# Patient Record
Sex: FEMALE | Race: BLACK OR AFRICAN AMERICAN | NOT HISPANIC OR LATINO | Employment: FULL TIME | URBAN - METROPOLITAN AREA
[De-identification: names, ages, dates, MRNs, and addresses within clinical notes are randomized per-mention and may not be internally consistent; named-entity substitution may affect disease eponyms.]

---

## 2017-10-21 ENCOUNTER — APPOINTMENT (OUTPATIENT)
Dept: GENERAL RADIOLOGY | Facility: CLINIC | Age: 37
End: 2017-10-21
Attending: EMERGENCY MEDICINE
Payer: COMMERCIAL

## 2017-10-21 ENCOUNTER — HOSPITAL ENCOUNTER (EMERGENCY)
Facility: CLINIC | Age: 37
Discharge: HOME | End: 2017-10-21
Attending: EMERGENCY MEDICINE | Admitting: EMERGENCY MEDICINE
Payer: COMMERCIAL

## 2017-10-21 VITALS
SYSTOLIC BLOOD PRESSURE: 159 MMHG | DIASTOLIC BLOOD PRESSURE: 104 MMHG | TEMPERATURE: 98.5 F | RESPIRATION RATE: 17 BRPM | HEART RATE: 83 BPM | OXYGEN SATURATION: 98 %

## 2017-10-21 DIAGNOSIS — T73.3XXA OVEREXERTION FROM PROLONGED STATIC POSITION: ICD-10-CM
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
## 2017-10-21 DIAGNOSIS — S93.492A SPRAIN OF ANTERIOR TALOFIBULAR LIGAMENT OF LEFT ANKLE, INITIAL ENCOUNTER: ICD-10-CM

## 2017-10-21 DIAGNOSIS — R07.89 OTHER CHEST PAIN: ICD-10-CM

## 2017-10-21 PROCEDURE — 99284 EMERGENCY DEPT VISIT MOD MDM: CPT | Mod: 25 | Performed by: EMERGENCY MEDICINE

## 2017-10-21 PROCEDURE — 25000132 ZZH RX MED GY IP 250 OP 250 PS 637: Performed by: EMERGENCY MEDICINE

## 2017-10-21 PROCEDURE — 73610 X-RAY EXAM OF ANKLE: CPT | Mod: LT

## 2017-10-21 PROCEDURE — 99284 EMERGENCY DEPT VISIT MOD MDM: CPT

## 2017-10-21 PROCEDURE — 93010 ELECTROCARDIOGRAM REPORT: CPT | Mod: Z6 | Performed by: EMERGENCY MEDICINE

## 2017-10-21 PROCEDURE — 25000125 ZZHC RX 250: Performed by: EMERGENCY MEDICINE

## 2017-10-21 PROCEDURE — 93005 ELECTROCARDIOGRAM TRACING: CPT

## 2017-10-21 PROCEDURE — 73590 X-RAY EXAM OF LOWER LEG: CPT | Mod: LT

## 2017-10-21 RX ORDER — NAPROXEN 500 MG/1
500 TABLET ORAL 2 TIMES DAILY PRN
Qty: 30 TABLET | Refills: 0 | Status: SHIPPED | OUTPATIENT
Start: 2017-10-21 | End: 2018-10-18

## 2017-10-21 RX ORDER — ACETAMINOPHEN 325 MG/1
650 TABLET ORAL ONCE
Status: COMPLETED | OUTPATIENT
Start: 2017-10-21 | End: 2017-10-21

## 2017-10-21 RX ORDER — IBUPROFEN 800 MG/1
800 TABLET, FILM COATED ORAL ONCE
Status: COMPLETED | OUTPATIENT
Start: 2017-10-21 | End: 2017-10-21

## 2017-10-21 RX ADMIN — ACETAMINOPHEN 650 MG: 325 TABLET, FILM COATED ORAL at 22:38

## 2017-10-21 RX ADMIN — IBUPROFEN 800 MG: 800 TABLET ORAL at 22:38

## 2017-10-21 RX ADMIN — LIDOCAINE HYDROCHLORIDE 30 ML: 20 SOLUTION ORAL; TOPICAL at 22:38

## 2017-10-21 NOTE — ED AVS SNAPSHOT
[facility], Emergency Department    [address] [facility] [address]    Phone:  [phone]                                       Elina Hood   MRN: [id number]    Department:  [facility], Emergency Department   Date of Visit:  10/21/2017           Patient Information     Date Of Birth          1980        Your diagnoses for this visit were:     Sprain of anterior talofibular ligament of left ankle, initial encounter        You were seen by Julian Sosa MD.        Discharge Instructions       Rest the affected painful area as much as possible.  Apply ice for 15-20 minutes intermittently as needed and especially after any offending activity. Daily stretching.  As pain recedes, begin normal activities slowly as tolerated.  Consider Physical Therapy if symptoms not better with symptomatic care.    Please make an appointment to follow up with Sports Medicine (phone: [phone]) in 10-14 days if pain continues.     Return to the ED if you are worsening symptoms, or any other urgent/life-threatening concerns.       24 Hour Appointment Hotline       To make an appointment at any [facility] clinic, call [phone] ([phone]). If you don't have a family doctor or clinic, we will help you find one. [facility] clinics are conveniently located to serve the needs of you and your family.             Review of your medicines      START taking        Dose / Directions Last dose taken    naproxen 500 MG tablet   Commonly known as:  NAPROSYN   Dose:  500 mg   Quantity:  30 tablet        Take 1 tablet (500 mg) by mouth 2 times daily as needed for moderate pain   Refills:  0          Our records show that you are taking the medicines listed below. If these are incorrect, please call your family doctor or clinic.        Dose / Directions Last dose taken    albuterol 1.25 MG/3ML nebulizer solution   Commonly known as:  ACCUNEB   Dose:  1 vial        Take 1 vial by nebulization every 6 hours as  needed for shortness of breath / dyspnea or wheezing   Refills:  0        COLACE PO   Dose:  50 mg        Take 50 mg by mouth   Refills:  0        HYDROcodone-acetaminophen 5-325 MG per tablet   Commonly known as:  NORCO   Dose:  1-2 tablet   Quantity:  15 tablet        Take 1-2 tablets by mouth every 6 hours as needed for moderate to severe pain   Refills:  0        IBUPROFEN PO   Dose:  400 mg        Take 400 mg by mouth   Refills:  0        loratadine 10 MG tablet   Commonly known as:  CLARITIN   Dose:  10 mg        Take 10 mg by mouth daily   Refills:  0        OMEPRAZOLE PO        Refills:  0        OXYCODONE HCL PO   Dose:  10 mg        Take 10 mg by mouth   Refills:  0        senna-docusate 8.6-50 MG per tablet   Commonly known as:  SENOKOT-S;PERICOLACE   Dose:  2 tablet   Quantity:  120 tablet        Take 2 tablets by mouth 2 times daily   Refills:  1                Prescriptions were sent or printed at these locations (1 Prescription)                   Other Prescriptions                Printed at Department/Unit printer (1 of 1)         naproxen (NAPROSYN) 500 MG tablet                Procedures and tests performed during your visit     Ankle XR, G/E 3 views, left    EKG 12 lead    Tib/Fib XR, left      Orders Needing Specimen Collection     None      Pending Results     Date and Time Order Name Status Description    10/21/2017 2204 EKG 12 lead Preliminary     10/21/2017 2204 Ankle XR, G/E 3 views, left Preliminary     10/21/2017 2204 Tib/Fib XR, left Preliminary             Pending Culture Results     No orders found from 10/19/2017 to 10/22/2017.            Pending Results Instructions     If you had any lab results that were not finalized at the time of your Discharge, you can call the ED Lab Result RN at [phone]. You will be contacted by this team for any positive Lab results or changes in treatment. The nurses are available 7 days a week from 10A to 6:30P.  You can leave a message 24 hours per  "day and they will return your call.        Thank you for choosing [facility]       Thank you for choosing [facility] for your care. Our goal is always to provide you with excellent care. Hearing back from our patients is one way we can continue to improve our services. Please take a few minutes to complete the written survey that you may receive in the mail after you visit with us. Thank you!        [employer] - [employer] Information     [employer]eez lets you send messages to your doctor, view your test results, renew your prescriptions, schedule appointments and more. To sign up, go to [email or web address] . Click on \"Log in\" on the left side of the screen, which will take you to the Welcome page. Then click on \"Sign up Now\" on the right side of the page.     You will be asked to enter the access code listed below, as well as some personal information. Please follow the directions to create your username and password.     Your access code is: [email or web address]  Expires: 2018 11:19 PM     Your access code will  in 90 days. If you need help or a new code, please call your [facility] clinic or [phone].        Care EveryWhere ID     This is your Care EveryWhere ID. This could be used by other organizations to access your [facility] medical records  [id number]        Equal Access to Services     EUFEMIA BLOUNT : Pina Hernandez, waaxda luqadaha, qaybta kaalmada adekatharineyada, dennys zapata. So [facility] [phone].    ATENCIÓN: Si habla español, tiene a hendricks disposición servicios gratuitos de asistencia lingüística. Llame al [phone].    We comply with applicable federal civil rights laws and [place] laws. We do not discriminate on the basis of race, color, national origin, age, disability, sex, sexual orientation, or gender identity.            After Visit Summary       This is your record. Keep this with you and show to your community pharmacist(s) and doctor(s) at your next visit.             "

## 2017-10-21 NOTE — ED AVS SNAPSHOT
[facility], [place], Emergency Department    [address] [place] [address]    Phone:  [phone]                                       Elina Hood   MRN: [id number]    Department:  [facility], Emergency Department   Date of Visit:  10/21/2017           After Visit Summary Signature Page     I have received my discharge instructions, and my questions have been answered. I have discussed any challenges I see with this plan with the nurse or doctor.    ..........................................................................................................................................  Patient/Patient Representative Signature      ..........................................................................................................................................  Patient Representative Print Name and Relationship to Patient    ..................................................               ................................................  Date                                            Time    ..........................................................................................................................................  Reviewed by Signature/Title    ...................................................              ..............................................  Date                                                            Time

## 2017-10-22 LAB — INTERPRETATION ECG - MUSE: NORMAL

## 2017-10-22 NOTE — DISCHARGE INSTRUCTIONS
Rest the affected painful area as much as possible.  Apply ice for 15-20 minutes intermittently as needed and especially after any offending activity. Daily stretching.  As pain recedes, begin normal activities slowly as tolerated.  Consider Physical Therapy if symptoms not better with symptomatic care.    Please make an appointment to follow up with Sports Medicine (phone: [phone]) in 10-14 days if pain continues.     Return to the ED if you are worsening symptoms, or any other urgent/life-threatening concerns.

## 2017-10-22 NOTE — ED NOTES
Patient hurt her left ankle running from a ghost at a haunted house yesterday. Since then it has become more swollen and painful. She reports taking aspirin x 2 for pain with little relief.

## 2018-01-03 ENCOUNTER — OFFICE VISIT (OUTPATIENT)
Dept: ORTHOPEDICS | Facility: CLINIC | Age: 38
End: 2018-01-03
Payer: COMMERCIAL

## 2018-01-03 VITALS — RESPIRATION RATE: 16 BRPM | DIASTOLIC BLOOD PRESSURE: 120 MMHG | HEART RATE: 70 BPM | SYSTOLIC BLOOD PRESSURE: 170 MMHG

## 2018-01-03 DIAGNOSIS — M25.572 CHRONIC PAIN OF LEFT ANKLE: Primary | ICD-10-CM

## 2018-01-03 DIAGNOSIS — G89.29 CHRONIC PAIN OF LEFT ANKLE: Primary | ICD-10-CM

## 2018-01-03 NOTE — LETTER
1/3/2018      RE: Elina Hood  [address]  [facility] [address]        Subjective:   Elina Hood is a 37 year old female who is c/o left lateral ankle pain. In the woods, tripped on tree stump and ankle turned.  Also pain dorsum of foot. ED 10/21/17. Currently using stirrup ankle brace. Reports doing exercises she found online. She fell in an eversion mechanism over tree stump.   Icing, ROM, heels for [place] and couldn't walk.    Hurts lateral and medial malleolus, forefoot.  Never had an ankle sprain before.    Background:   Date of injury: 10/20/17   Duration of symptoms: 2-3 months  Mechanism of Injury: Acute; Unknown Fell  Intensity: 4/10 at rest, 6/10 with activity   Aggravating factors: Walking, stairs, plantarflexion and inversion, pressure  Relieving Factors: Tylenol, ibuprofen  Prior Evaluation: Prior Physician Evalutation: ED, xrays    PAST MEDICAL, SOCIAL, SURGICAL AND FAMILY HISTORY: She  has a past medical history of Allergic state; GERD (gastroesophageal reflux disease); and Hypertension.  She  has no past surgical history on file.  Her family history is not on file.  She reports that she has never smoked. She has never used smokeless tobacco. She reports that she drinks alcohol. She reports that she does not use illicit drugs.      ALLERGIES: She is allergic to dilaudid [hydromorphone].    CURRENT MEDICATIONS: She has a current medication list which includes the following prescription(s): naproxen, albuterol, ibuprofen, senna-docusate, loratadine, docusate sodium, omeprazole, hydrocodone-acetaminophen, and oxycodone hcl.     REVIEW OF SYSTEMS: 10 point review of systems is negative except as noted above.     Exam:   BP (!) 170/120 (BP Location: Right arm, Patient Position: Sitting, Cuff Size: Adult Large)  Pulse 70  Resp 16  LMP  (LMP Unknown)           CONSTITUTIONAL: healthy, alert, no distress and cooperative  HEAD: Normocephalic. No masses, lesions, tenderness or  abnormalities  SKIN: no suspicious lesions or rashes  GAIT: normal  NEUROLOGIC: Non-focal, Normal muscle tone and strength, reflexes normal, sensation grossly normal.  PSYCHIATRIC: affect normal/bright and mentation appears normal.    MUSCULOSKELETAL: left ankle pain    ANKLE  Inspection/Palpation:     Swelling: mild lateral left ankle swelling   Tender: ATFL, CFL, PTFL, peroneal tendons, medial malleolus     Non-tender: deltoid ligament, anterior tib-fib ligament, achilles  tendon, no achilles tendon defect   Range of Motion: dorsiflexion: full, plantarflexion: full, inversion: full, eversion: full  Strength:dorsiflexion: 5/5, plantarflexion: 5/5, inversion: 5/5, eversion: 5/5   Special tests: negative anterior drawer, negative varus stress, negative valgus stress, negative forced external rotation, negative Venegas sign     FOOT  Inspection/Palpation:      Swelling: no swelling     Non-tender: promixal 5th metatarsal, 1st, 2nd, 3rd, 4th, 5th metatarsals, calcaneous, cuboid,  navicular, cuneiform lateral, cuneiform middle, cuneiform medial, metatarsal heads, peroneal tendon: at lateral malleolus, at cuboid, at proximal 5th metatarsal, posterior tibial tendon at medial malleolus, posterior tibial tendon at navicular, plantar fascia  Range of Motion: flexion of toes: full, extension of toes: full         Assessment/Plan:   Pt is a 38 yo obese AA female with PMHx of HTN, GERD presenting with left ankle pain  1. Left ankle pain-Grade 2 ankle sprain back in October, has been in Air cast since, needs to advance ROM and use more supportive shoe, to PT for home strengthening program and work on balance and proprioception    RTC 6 weeks      X-RAY INTERPRETATION:   Reviewed past imaging    Kerrie Pretty MD

## 2018-01-03 NOTE — PROGRESS NOTES
Subjective:   Elina Hood is a 37 year old female who is c/o left lateral ankle pain. In the woods, tripped on tree stump and ankle turned.  Also pain dorsum of foot. ED 10/21/17. Currently using stirrup ankle brace. Reports doing exercises she found online. She fell in an eversion mechanism over tree stump.   Icing, ROM, heels for [place] and couldn't walk.    Hurts lateral and medial malleolus, forefoot.  Never had an ankle sprain before.    Background:   Date of injury: 10/20/17   Duration of symptoms: 2-3 months  Mechanism of Injury: Acute; Unknown Fell  Intensity: 4/10 at rest, 6/10 with activity   Aggravating factors: Walking, stairs, plantarflexion and inversion, pressure  Relieving Factors: Tylenol, ibuprofen  Prior Evaluation: Prior Physician Evalutation: ED, xrays    PAST MEDICAL, SOCIAL, SURGICAL AND FAMILY HISTORY: She  has a past medical history of Allergic state; GERD (gastroesophageal reflux disease); and Hypertension.  She  has no past surgical history on file.  Her family history is not on file.  She reports that she has never smoked. She has never used smokeless tobacco. She reports that she drinks alcohol. She reports that she does not use illicit drugs.      ALLERGIES: She is allergic to dilaudid [hydromorphone].    CURRENT MEDICATIONS: She has a current medication list which includes the following prescription(s): naproxen, albuterol, ibuprofen, senna-docusate, loratadine, docusate sodium, omeprazole, hydrocodone-acetaminophen, and oxycodone hcl.     REVIEW OF SYSTEMS: 10 point review of systems is negative except as noted above.     Exam:   BP (!) 170/120 (BP Location: Right arm, Patient Position: Sitting, Cuff Size: Adult Large)  Pulse 70  Resp 16  LMP  (LMP Unknown)           CONSTITUTIONAL: healthy, alert, no distress and cooperative  HEAD: Normocephalic. No masses, lesions, tenderness or abnormalities  SKIN: no suspicious lesions or rashes  GAIT: normal  NEUROLOGIC: Non-focal,  Normal muscle tone and strength, reflexes normal, sensation grossly normal.  PSYCHIATRIC: affect normal/bright and mentation appears normal.    MUSCULOSKELETAL: left ankle pain    ANKLE  Inspection/Palpation:     Swelling: mild lateral left ankle swelling   Tender: ATFL, CFL, PTFL, peroneal tendons, medial malleolus     Non-tender: deltoid ligament, anterior tib-fib ligament, achilles  tendon, no achilles tendon defect   Range of Motion: dorsiflexion: full, plantarflexion: full, inversion: full, eversion: full  Strength:dorsiflexion: 5/5, plantarflexion: 5/5, inversion: 5/5, eversion: 5/5   Special tests: negative anterior drawer, negative varus stress, negative valgus stress, negative forced external rotation, negative Venegas sign     FOOT  Inspection/Palpation:      Swelling: no swelling     Non-tender: promixal 5th metatarsal, 1st, 2nd, 3rd, 4th, 5th metatarsals, calcaneous, cuboid,  navicular, cuneiform lateral, cuneiform middle, cuneiform medial, metatarsal heads, peroneal tendon: at lateral malleolus, at cuboid, at proximal 5th metatarsal, posterior tibial tendon at medial malleolus, posterior tibial tendon at navicular, plantar fascia  Range of Motion: flexion of toes: full, extension of toes: full         Assessment/Plan:   Pt is a 36 yo obese AA female with PMHx of HTN, GERD presenting with left ankle pain  1. Left ankle pain-Grade 2 ankle sprain back in October, has been in Air cast since, needs to advance ROM and use more supportive shoe, to PT for home strengthening program and work on balance and proprioception    RTC 6 weeks      X-RAY INTERPRETATION:   Reviewed past imaging

## 2018-01-03 NOTE — MR AVS SNAPSHOT
After Visit Summary   1/3/2018    Elina Hood    MRN: [id number]           Patient Information     Date Of Birth          1980        Visit Information        Provider Department      1/3/2018 12:00 PM Kerrie Pretty MD [facility]        Today's Diagnoses     Chronic pain of left ankle    -  1       Follow-ups after your visit        Additional Services     PHYSICAL THERAPY REFERRAL (Internal)       Physical Therapy Referral                  Follow-up notes from your care team     Return in about 6 weeks (around 2/14/2018), or if symptoms worsen or fail to improve.      Your next 10 appointments already scheduled     Feb 14, 2018 10:00 AM CST   (Arrive by 9:45 AM)   Return Visit with Kerrie Pretty MD   [facility] ([facility])    [address] [place] [address] [phone]              Who to contact     Please call your clinic at [phone] to:    Ask questions about your health    Make or cancel appointments    Discuss your medicines    Learn about your test results    Speak to your doctor   If you have compliments or concerns about an experience at your clinic, or if you wish to file a complaint, please contact [facility] Patient Relations at [phone] or email us at [email or web address]         Additional Information About Your Visit        MyChart Information     [employer] is an electronic gateway that provides easy, online access to your medical records. With [employer], you can request a clinic appointment, read your test results, renew a prescription or communicate with your care team.     To sign up for [employer] visit the website at [email or web address]   You will be asked to enter the access code listed below, as well as some personal information. Please follow the directions to create your username and password.     Your access  code is: [email or web address]  Expires: 2018 10:19 PM     Your access code will  in 90 days. If you need help or a new code, please contact your [facility] or call [phone] for assistance.        Care EveryWhere ID     This is your Care EveryWhere ID. This could be used by other organizations to access your [facility] medical records  [id number]        Your Vitals Were     Pulse Respirations Last Period             70 16 (LMP Unknown)          Blood Pressure from Last 3 Encounters:   18 (!) 170/120   10/21/17 (!) 159/104   16 (!) 169/99    Weight from Last 3 Encounters:   16 210 lb (95.3 kg)   16 210 lb (95.3 kg)   16 250 lb (113.4 kg)              We Performed the Following     PHYSICAL THERAPY REFERRAL (Internal)        Primary Care Provider Office Phone # Fax #    Marleen Santiago [phone]       [facility] [address] [facility] [address]        Equal Access to Services     [facility]: Hadii aad ku hadasho Soeliecer, waaxda luqadaha, qaybta kaalmada zander, dennys linda . So [facility] [phone].    ATENCIÓN: Si habla español, tiene a hendricks disposición servicios gratuitos de asistencia lingüística. Ra al [phone].    We comply with applicable federal civil rights laws and [place] laws. We do not discriminate on the basis of race, color, national origin, age, disability, sex, sexual orientation, or gender identity.            Thank you!     Thank you for choosing [facility]  for your care. Our goal is always to provide you with excellent care. Hearing back from our patients is one way we can continue to improve our services. Please take a few minutes to complete the written survey that you may receive in the mail after your visit with us. Thank you!             Your Updated Medication List - Protect others around you: Learn how to safely use, store and  throw away your medicines at www.disposemymeds.org.          This list is accurate as of: 1/3/18 12:40 PM.  Always use your most recent med list.                   Brand Name Dispense Instructions for use Diagnosis    albuterol 1.25 MG/3ML nebulizer solution    ACCUNEB     Take 1 vial by nebulization every 6 hours as needed for shortness of breath / dyspnea or wheezing        COLACE PO      Take 50 mg by mouth        HYDROcodone-acetaminophen 5-325 MG per tablet    NORCO    15 tablet    Take 1-2 tablets by mouth every 6 hours as needed for moderate to severe pain        IBUPROFEN PO      Take 400 mg by mouth        loratadine 10 MG tablet    CLARITIN     Take 10 mg by mouth daily        naproxen 500 MG tablet    NAPROSYN    30 tablet    Take 1 tablet (500 mg) by mouth 2 times daily as needed for moderate pain        OMEPRAZOLE PO           OXYCODONE HCL PO      Take 10 mg by mouth        senna-docusate 8.6-50 MG per tablet    SENOKOT-S;PERICOLACE    120 tablet    Take 2 tablets by mouth 2 times daily

## 2018-01-17 ENCOUNTER — THERAPY VISIT (OUTPATIENT)
Dept: PHYSICAL THERAPY | Facility: CLINIC | Age: 38
End: 2018-01-17
Payer: COMMERCIAL

## 2018-01-17 DIAGNOSIS — S93.409A ANKLE SPRAIN: Primary | ICD-10-CM

## 2018-01-17 PROCEDURE — 97110 THERAPEUTIC EXERCISES: CPT | Mod: GP | Performed by: PHYSICAL THERAPIST

## 2018-01-17 PROCEDURE — 97161 PT EVAL LOW COMPLEX 20 MIN: CPT | Mod: GP | Performed by: PHYSICAL THERAPIST

## 2018-01-17 NOTE — PROGRESS NOTES
[facility] Initial Evaluation  Subjective:  Patient is a 37 year old female presenting with rehab right ankle/foot hpi.   Elina Hood is a 37 year old female with a left ankle condition.  Condition occurred with:  A fall/slip and a twist.  Condition occurred: in the community.  This is a new condition  October 20, 2017 - Pt sprained left ankle while hiking in the woods and twisting ankle on a tree branch. Initially had a lot of swelling. Swelling and pain has improved bug pain and instability has limited her. She has not been able to get back to wearing heels. Was wearing a brace for awhile but has been out of it lately. Notices with stairs and walking that ankle can feel weak and want to give out. She has not been able to walk long periods as well. .    Patient reports pain:  Lateral (medial ankle and top of foot occasionally ).  Radiates to:  No radiation.  Pain is described as aching and sharp and is constant Pain Scale: 6/10 at worst on stairs, otherwise usually 3/10.  Associated symptoms:  Edema, loss of strength and loss of motion/stiffness. Pain is the same all the time.  Symptoms are exacerbated by ascending stairs, descending stairs, walking and sitting and relieved by rest and other (was wearing brace and was icing).  Since onset symptoms are gradually improving.  Special tests:  X-ray (negative).  Previous treatment: none.  Improvement with previous treatment: NA.  General health as reported by patient is good.                                              Objective:  System    Ankle/Foot Evaluation  ROM:    AROM:    Dorsiflexion:  Left:   5 knee extended, 12 knee flexed  Right:   5 knee extended, 12 knee flexed  Plantarflexion:  Left:  36    Right:  47  Inversion:  Left:  14     Right:  22  Eversion:  14     Right:  14        Strength:    Dorsiflexion:  Left: 4/5     Pain:   Right: 5/5   Pain:  Plantarflexion: Left: 4+/5   Pain:   Right: 5/5  Pain:  Inversion:Left: 4+/5  Pain:      Right: 5/5  Pain:  Eversion:Left: 4+/5  Pain:  Right: 5/5  Pain:                  LIGAMENT TESTING:   Anterior Drawer (ATF) Left: Gr II and pos     Posterior Drawer (PTF) Left: Gr I     Varus Stress (Calc Fib) Left: trace and pos              PALPATION:   Left ankle tenderness present at:  posterior talofibular ligament; calcaneofibular ligament and lateral malleolus  Left ankle tenderness not present at:   deltoid ligament; anterior talofibular ligament or medial malleolus    EDEMA:   Left ankle edema present at: lateral          MOBILITY TESTING:       Talocrural Left: normal            FUNCTIONAL TESTS:           Proprioception:  Stork Balance Test: Left: 7 sec (And pain)  Right: 30 sec                                                     General     ROS    Assessment/Plan:    Patient is a 37 year old female with left side ankle complaints.    Patient has the following significant findings with corresponding treatment plan.                Diagnosis 1:  Left Lateral Ankle Sprain    Pain -  hot/cold therapy, manual therapy, self management, education and home program  Decreased ROM/flexibility - manual therapy, therapeutic exercise and home program  Decreased strength - therapeutic exercise, therapeutic activities and home program  Impaired balance - neuro re-education, therapeutic activities and home program  Inflammation - cold therapy and self management/home program  Decreased function - therapeutic activities and home program  Instability -  Therapeutic Activity  Therapeutic Exercise  Neuromuscular Re-education  home program    Therapy Evaluation Codes:   1) History comprised of:   Personal factors that impact the plan of care:      None.    Comorbidity factors that impact the plan of care are:      Overweight.     Medications impacting care: Pain.  2) Examination of Body Systems comprised of:   Body structures and functions that impact the plan of care:      Ankle.   Activity limitations that impact the plan  of care are:      Sitting, Stairs and Walking.  3) Clinical presentation characteristics are:   Stable/Uncomplicated.  4) Decision-Making    Low complexity using standardized patient assessment instrument and/or measureable assessment of functional outcome.  Cumulative Therapy Evaluation is: Low complexity.    Previous and current functional limitations:  (See Goal Flow Sheet for this information)    Short term and Long term goals: (See Goal Flow Sheet for this information)     Communication ability:  Patient appears to be able to clearly communicate and understand verbal and written communication and follow directions correctly.  Treatment Explanation - The following has been discussed with the patient:   RX ordered/plan of care  Anticipated outcomes  Possible risks and side effects  This patient would benefit from PT intervention to resume normal activities.   Rehab potential is good.    Frequency:  1 X week, once daily  Duration:  for 4 weeks tapering to 2 X a month over 8 weeks  Discharge Plan:  Achieve all LTG.  Independent in home treatment program.  Reach maximal therapeutic benefit.    Please refer to the daily flowsheet for treatment today, total treatment time and time spent performing 1:1 timed codes.

## 2018-01-17 NOTE — MR AVS SNAPSHOT
"              After Visit Summary   1/17/2018    Elina oHod    MRN: [id number]           Patient Information     Date Of Birth          1980        Visit Information        Provider Department      1/17/2018 11:30 AM Marisa Boogie PT [facility] [place]        Today's Diagnoses     Ankle sprain    -  1       Follow-ups after your visit        Your next 10 appointments already scheduled     Feb 14, 2018 10:00 AM CST   (Arrive by 9:45 AM)   Return Visit with Kerrie Pretty MD   [facility] ([facility])    [address] [place] [id number] [phone]              Who to contact     If you have questions or need follow up information about today's clinic visit or your schedule please contact [place] [facility] [employer] [place] directly at [phone].  Normal or non-critical lab and imaging results will be communicated to you by MyChart, letter or phone within 4 business days after the clinic has received the results. If you do not hear from us within 7 days, please contact the clinic through MyChart or phone. If you have a critical or abnormal lab result, we will notify you by phone as soon as possible.  Submit refill requests through Sunrise or call your pharmacy and they will forward the refill request to us. Please allow 3 business days for your refill to be completed.          Additional Information About Your Visit        MyChart Information     Sunrise lets you send messages to your doctor, view your test results, renew your prescriptions, schedule appointments and more. To sign up, go to [email or web address] . Click on \"Log in\" on the left side of the screen, which will take you to the Welcome page. Then click on \"Sign up Now\" on the right side of the page.     You will be asked to enter the access code listed below, as well as some personal information. Please follow the directions to " create your username and password.     Your access code is: [email or web address]  Expires: 2018 10:19 PM     Your access code will  in 90 days. If you need help or a new code, please call your [place] clinic or [phone].        Care EveryWhere ID     This is your Care EveryWhere ID. This could be used by other organizations to access your [place] medical records  [id number]        Your Vitals Were     Last Period                   (LMP Unknown)            Blood Pressure from Last 3 Encounters:   18 (!) 170/120   10/21/17 (!) 159/104   16 (!) 169/99    Weight from Last 3 Encounters:   16 95.3 kg (210 lb)   16 95.3 kg (210 lb)   16 113.4 kg (250 lb)              We Performed the Following     HC PT EVAL, LOW COMPLEXITY     GELACIO INITIAL EVAL REPORT     THERAPEUTIC EXERCISES        Primary Care Provider Office Phone # Fax #    Marleen Santiago [phone]       [place] [facility] [address] [facility] [address]        Equal Access to Services     [facility]: Hadii aad ku hadasho Soomaali, waaxda luqadaha, qaybta kaalmada adeegyada, waxay idiin hayaan rebecca linda . So [facility] [phone].    ATENCIÓN: Si habla español, tiene a hendricks disposición servicios gratuitos de asistencia lingüística. Llame al [phone].    We comply with applicable federal civil rights laws and [place] laws. We do not discriminate on the basis of race, color, national origin, age, disability, sex, sexual orientation, or gender identity.            Thank you!     Thank you for choosing [facility] [place]  for your care. Our goal is always to provide you with excellent care. Hearing back from our patients is one way we can continue to improve our services. Please take a few minutes to complete the written survey that you may receive in the mail after your visit with us. Thank you!             Your Updated Medication List - Protect others  around you: Learn how to safely use, store and throw away your medicines at www.disposemymeds.org.          This list is accurate as of: 1/17/18  2:04 PM.  Always use your most recent med list.                   Brand Name Dispense Instructions for use Diagnosis    albuterol 1.25 MG/3ML nebulizer solution    ACCUNEB     Take 1 vial by nebulization every 6 hours as needed for shortness of breath / dyspnea or wheezing        COLACE PO      Take 50 mg by mouth        HYDROcodone-acetaminophen 5-325 MG per tablet    NORCO    15 tablet    Take 1-2 tablets by mouth every 6 hours as needed for moderate to severe pain        IBUPROFEN PO      Take 400 mg by mouth        loratadine 10 MG tablet    CLARITIN     Take 10 mg by mouth daily        naproxen 500 MG tablet    NAPROSYN    30 tablet    Take 1 tablet (500 mg) by mouth 2 times daily as needed for moderate pain        OMEPRAZOLE PO           OXYCODONE HCL PO      Take 10 mg by mouth        senna-docusate 8.6-50 MG per tablet    SENOKOT-S;PERICOLACE    120 tablet    Take 2 tablets by mouth 2 times daily

## 2018-01-18 NOTE — PROGRESS NOTES
[facility] Initial Evaluation  Subjective:  Patient is a 37 year old female presenting with rehab left ankle/foot hpi.                    and reported as 3/10.                  Pertinent medical history includes:  High blood pressure, depression, overweight and migraines.      Current medications:  Muscle relaxants, high blood pressure medication and pain medication.      Employment tasks: driving.                                Objective:  System    Physical Exam    General     ROS    Assessment/Plan:

## 2018-02-20 ENCOUNTER — THERAPY VISIT (OUTPATIENT)
Dept: PHYSICAL THERAPY | Facility: CLINIC | Age: 38
End: 2018-02-20
Payer: COMMERCIAL

## 2018-02-20 DIAGNOSIS — S93.402D SPRAIN OF LEFT ANKLE, UNSPECIFIED LIGAMENT, SUBSEQUENT ENCOUNTER: ICD-10-CM

## 2018-02-20 PROCEDURE — 97110 THERAPEUTIC EXERCISES: CPT | Mod: GP | Performed by: PHYSICAL THERAPIST

## 2018-02-20 PROCEDURE — 97112 NEUROMUSCULAR REEDUCATION: CPT | Mod: GP | Performed by: PHYSICAL THERAPIST

## 2018-02-20 NOTE — MR AVS SNAPSHOT
"              After Visit Summary   2/20/2018    Elina Hood    MRN: [id number]           Patient Information     Date Of Birth          1980        Visit Information        Provider Department      2/20/2018 7:40 AM Marisa Boogie PT [facility]        Today's Diagnoses     Sprain of left ankle, unspecified ligament, subsequent encounter           Follow-ups after your visit        Your next 10 appointments already scheduled     Feb 28, 2018 10:50 AM CST   GELACIO Extremity with Marisa Boogie PT   [facility] [facility] ([facility])    [address] [place] [id number] [phone]            Mar 06, 2018 11:00 AM CST   GELACIO Extremity with Marisa Boogie PT   [facility] [employer] [facility] ([facility])    [address] [place] [id number] [phone]              Who to contact     If you have questions or need follow up information about today's clinic visit or your schedule please contact [facility] directly at [phone].  Normal or non-critical lab and imaging results will be communicated to you by [facility], letter or phone within 4 business days after the clinic has received the results. If you do not hear from us within 7 days, please contact the clinic through [employer] or phone. If you have a critical or abnormal lab result, we will notify you by phone as soon as possible.  Submit refill requests through myWebRoom or call your pharmacy and they will forward the refill request to us. Please allow 3 business days for your refill to be completed.          Additional Information About Your Visit        [facility] Information     myWebRoom lets you send messages to your doctor, view your test results, renew your prescriptions, schedule appointments and more. To sign up, go to [email or web address] . Click on \"Log in\" on the left side of the screen, " "which will take you to the Welcome page. Then click on \"Sign up Now\" on the right side of the page.     You will be asked to enter the access code listed below, as well as some personal information. Please follow the directions to create your username and password.     Your access code is: [email or web address]  Expires: 2018  6:31 AM     Your access code will  in 90 days. If you need help or a new code, please call your [place] clinic or [phone].        Care EveryWhere ID     This is your Care EveryWhere ID. This could be used by other organizations to access your [place] medical records  [id number]        Your Vitals Were     Last Period                   (LMP Unknown)            Blood Pressure from Last 3 Encounters:   18 (!) 170/120   10/21/17 (!) 159/104   16 (!) 169/99    Weight from Last 3 Encounters:   16 95.3 kg (210 lb)   16 95.3 kg (210 lb)   16 113.4 kg (250 lb)              We Performed the Following     GELACIO PROGRESS NOTES REPORT     NEUROMUSCULAR RE-EDUCATION     THERAPEUTIC EXERCISES        Primary Care Provider Office Phone # Fax #    Marleen Santiago [phone]       [facility] [address] [facility] [address]        Equal Access to Services     [facility] : Hadii aad ku hadasho Soarunali, waaxda luqadaha, qaybta kaalmada adekatharineyada, dennys linda . So [facility] [phone].    ATENCIÓN: Si habla español, tiene a hendricks disposición servicios gratuitos de asistencia lingüística. Ra al [phone].    We comply with applicable federal civil rights laws and [place] laws. We do not discriminate on the basis of race, color, national origin, age, disability, sex, sexual orientation, or gender identity.            Thank you!     Thank you for choosing [facility] [place]  for your care. Our goal is always to provide you with excellent care. Hearing back from our patients is one " way we can continue to improve our services. Please take a few minutes to complete the written survey that you may receive in the mail after your visit with us. Thank you!             Your Updated Medication List - Protect others around you: Learn how to safely use, store and throw away your medicines at www.disposemymeds.org.          This list is accurate as of 2/20/18  8:17 AM.  Always use your most recent med list.                   Brand Name Dispense Instructions for use Diagnosis    albuterol 1.25 MG/3ML nebulizer solution    ACCUNEB     Take 1 vial by nebulization every 6 hours as needed for shortness of breath / dyspnea or wheezing        COLACE PO      Take 50 mg by mouth        HYDROcodone-acetaminophen 5-325 MG per tablet    NORCO    15 tablet    Take 1-2 tablets by mouth every 6 hours as needed for moderate to severe pain        IBUPROFEN PO      Take 400 mg by mouth        loratadine 10 MG tablet    CLARITIN     Take 10 mg by mouth daily        naproxen 500 MG tablet    NAPROSYN    30 tablet    Take 1 tablet (500 mg) by mouth 2 times daily as needed for moderate pain        OMEPRAZOLE PO           OXYCODONE HCL PO      Take 10 mg by mouth        senna-docusate 8.6-50 MG per tablet    SENOKOT-S;PERICOLACE    120 tablet    Take 2 tablets by mouth 2 times daily

## 2018-02-20 NOTE — PROGRESS NOTES
Subjective:  HPI                    Objective:  System    Physical Exam    General     ROS    Assessment/Plan:    PROGRESS  REPORT    Progress reporting period is from 1/17/18 to 2/20/18.       SUBJECTIVE  Subjective: pt reports ankle continues to be irritated. was rushing last week and feels she did something to irritate it.  before that it had been doing better though.     Current Pain level: 5/10.      Initial Pain level: 6/10.   Changes in function:  Yes (See Goal flowsheet attached for changes in current functional level)  Adverse reaction to treatment or activity: activity - rolled ankle last week    OBJECTIVE  Changes noted in objective findings:  Yes  Objective: AROM: DF 11 with knee ex, PF 41, INV 20, EV 14. MMT: DF 4+/5, PF 4+/5, INV 4/5, EV 4+/5. SL balance: L 15 sec, R >30 sec     ASSESSMENT/PLAN  Updated problem list and treatment plan: Diagnosis 1:  Left Lateral Ankle Sprain    Pain -  manual therapy, self management, education and home program  Decreased ROM/flexibility - manual therapy, therapeutic exercise and home program  Decreased strength - therapeutic exercise, therapeutic activities and home program  Impaired balance - neuro re-education, therapeutic activities and home program  Decreased proprioception - neuro re-education, therapeutic activities and home program  Impaired muscle performance - neuro re-education and home program  Decreased function - therapeutic activities and home program  Instability -  Therapeutic Activity  Therapeutic Exercise  Neuromuscular Re-education  home program  STG/LTGs have been met or progress has been made towards goals:  Yes (See Goal flow sheet completed today.)  Assessment of Progress: The patient's condition is improving.  Self Management Plans:  Patient has been instructed in a home treatment program.  Patient  has been instructed in self management of symptoms.  I have re-evaluated this patient and find that the nature, scope, duration and intensity of  the therapy is appropriate for the medical condition of the patient.  Elina continues to require the following intervention to meet STG and LTG's:  PT    Recommendations:  This patient would benefit from continued therapy.     Frequency:  1 X week, once daily  Duration:  for 4 weeks tapering to 2 X a month over 4 weeks        Please refer to the daily flowsheet for treatment today, total treatment time and time spent performing 1:1 timed codes.

## 2018-02-28 ENCOUNTER — THERAPY VISIT (OUTPATIENT)
Dept: PHYSICAL THERAPY | Facility: CLINIC | Age: 38
End: 2018-02-28
Payer: COMMERCIAL

## 2018-02-28 DIAGNOSIS — S93.402D SPRAIN OF LEFT ANKLE, UNSPECIFIED LIGAMENT, SUBSEQUENT ENCOUNTER: ICD-10-CM

## 2018-02-28 PROCEDURE — 97110 THERAPEUTIC EXERCISES: CPT | Mod: GP | Performed by: PHYSICAL THERAPIST

## 2018-02-28 PROCEDURE — 97112 NEUROMUSCULAR REEDUCATION: CPT | Mod: GP | Performed by: PHYSICAL THERAPIST

## 2018-03-03 ENCOUNTER — HEALTH MAINTENANCE LETTER (OUTPATIENT)
Age: 38
End: 2018-03-03

## 2018-03-06 ENCOUNTER — THERAPY VISIT (OUTPATIENT)
Dept: PHYSICAL THERAPY | Facility: CLINIC | Age: 38
End: 2018-03-06
Payer: COMMERCIAL

## 2018-03-06 DIAGNOSIS — S93.402D SPRAIN OF LEFT ANKLE, UNSPECIFIED LIGAMENT, SUBSEQUENT ENCOUNTER: ICD-10-CM

## 2018-03-06 PROCEDURE — 97140 MANUAL THERAPY 1/> REGIONS: CPT | Mod: GP | Performed by: PHYSICAL THERAPIST

## 2018-03-06 PROCEDURE — 97110 THERAPEUTIC EXERCISES: CPT | Mod: GP | Performed by: PHYSICAL THERAPIST

## 2018-03-06 PROCEDURE — 97112 NEUROMUSCULAR REEDUCATION: CPT | Mod: GP | Performed by: PHYSICAL THERAPIST

## 2018-03-06 NOTE — MR AVS SNAPSHOT
After Visit Summary   3/6/2018    Elina Hood    MRN: [id number]           Patient Information     Date Of Birth          1980        Visit Information        Provider Department      3/6/2018 11:00 AM Marisa Boogie PT [facility] [employer] [place]        Today's Diagnoses     Sprain of left ankle, unspecified ligament, subsequent encounter           Follow-ups after your visit        Your next 10 appointments already scheduled     Mar 13, 2018 10:20 AM CDT   GELACIO Extremity with Marisa Boogie PT   [facility] [place] ([facility])    [address] [place] [id number] [phone]              Who to contact     If you have questions or need follow up information about today's clinic visit or your schedule please contact [facility] [employer] [place] directly at [phone].  Normal or non-critical lab and imaging results will be communicated to you by MyChart, letter or phone within 4 business days after the clinic has received the results. If you do not hear from us within 7 days, please contact the clinic through [employer] or phone. If you have a critical or abnormal lab result, we will notify you by phone as soon as possible.  Submit refill requests through [employer] or call your pharmacy and they will forward the refill request to us. Please allow 3 business days for your refill to be completed.          Additional Information About Your Visit        MyChart Information     [employer] gives you secure access to your electronic health record. If you see a primary care provider, you can also send messages to your care team and make appointments. If you have questions, please call your primary care clinic.  If you do not have a primary care provider, please call [phone] and they will assist you.        Care EveryWhere ID     This is your Care EveryWhere ID. This could be used by other organizations to access  your [place] medical records  [id number]        Your Vitals Were     Last Period                   (LMP Unknown)            Blood Pressure from Last 3 Encounters:   01/03/18 (!) 170/120   10/21/17 (!) 159/104   12/23/16 (!) 169/99    Weight from Last 3 Encounters:   12/23/16 95.3 kg (210 lb)   07/08/16 95.3 kg (210 lb)   06/24/16 113.4 kg (250 lb)              We Performed the Following     MANUAL THER TECH,1+REGIONS,EA 15 MIN     NEUROMUSCULAR RE-EDUCATION     THERAPEUTIC EXERCISES        Primary Care Provider Office Phone # Fax #    Marleen Santiago [phone]       [facility] [address] [facility] [address]        Equal Access to Services     EUFEMIA [facility]OUNT : Hadii aad ku hadasho Soomaali, waaxda luqadaha, qaybta kaalmada adeegyada, waxay idiin hayapril linda . So [facility] [phone].    ATENCIÓN: Si habla español, tiene a hendricks disposición servicios gratuitos de asistencia lingüística. Ra al [phone].    We comply with applicable federal civil rights laws and [place] laws. We do not discriminate on the basis of race, color, national origin, age, disability, sex, sexual orientation, or gender identity.            Thank you!     Thank you for choosing [place] [facility] [place]  for your care. Our goal is always to provide you with excellent care. Hearing back from our patients is one way we can continue to improve our services. Please take a few minutes to complete the written survey that you may receive in the mail after your visit with us. Thank you!             Your Updated Medication List - Protect others around you: Learn how to safely use, store and throw away your medicines at www.disposemymeds.org.          This list is accurate as of 3/6/18 11:47 AM.  Always use your most recent med list.                   Brand Name Dispense Instructions for use Diagnosis    albuterol 1.25 MG/3ML nebulizer solution    ACCUNEB     Take 1 vial  by nebulization every 6 hours as needed for shortness of breath / dyspnea or wheezing        COLACE PO      Take 50 mg by mouth        HYDROcodone-acetaminophen 5-325 MG per tablet    NORCO    15 tablet    Take 1-2 tablets by mouth every 6 hours as needed for moderate to severe pain        IBUPROFEN PO      Take 400 mg by mouth        loratadine 10 MG tablet    CLARITIN     Take 10 mg by mouth daily        naproxen 500 MG tablet    NAPROSYN    30 tablet    Take 1 tablet (500 mg) by mouth 2 times daily as needed for moderate pain        OMEPRAZOLE PO           OXYCODONE HCL PO      Take 10 mg by mouth        senna-docusate 8.6-50 MG per tablet    SENOKOT-S;PERICOLACE    120 tablet    Take 2 tablets by mouth 2 times daily

## 2018-07-07 ENCOUNTER — HOSPITAL ENCOUNTER (EMERGENCY)
Facility: CLINIC | Age: 38
Discharge: HOME | End: 2018-07-07
Attending: EMERGENCY MEDICINE | Admitting: EMERGENCY MEDICINE
Payer: COMMERCIAL

## 2018-07-07 VITALS
DIASTOLIC BLOOD PRESSURE: 108 MMHG | RESPIRATION RATE: 15 BRPM | WEIGHT: 210 LBS | SYSTOLIC BLOOD PRESSURE: 169 MMHG | TEMPERATURE: 98.3 F | HEIGHT: 65 IN | BODY MASS INDEX: 34.99 KG/M2 | OXYGEN SATURATION: 100 %

## 2018-07-07 DIAGNOSIS — R07.89 ATYPICAL CHEST PAIN: ICD-10-CM

## 2018-07-07 DIAGNOSIS — I10 HYPERTENSION, UNSPECIFIED TYPE: ICD-10-CM

## 2018-07-07 LAB — TROPONIN I SERPL-MCNC: <0.015 UG/L (ref 0–0.04)

## 2018-07-07 PROCEDURE — 99284 EMERGENCY DEPT VISIT MOD MDM: CPT | Mod: 25 | Performed by: EMERGENCY MEDICINE

## 2018-07-07 PROCEDURE — 93010 ELECTROCARDIOGRAM REPORT: CPT | Mod: Z6 | Performed by: EMERGENCY MEDICINE

## 2018-07-07 PROCEDURE — 84484 ASSAY OF TROPONIN QUANT: CPT | Performed by: EMERGENCY MEDICINE

## 2018-07-07 PROCEDURE — 99284 EMERGENCY DEPT VISIT MOD MDM: CPT | Performed by: EMERGENCY MEDICINE

## 2018-07-07 PROCEDURE — 93005 ELECTROCARDIOGRAM TRACING: CPT | Performed by: EMERGENCY MEDICINE

## 2018-07-07 ASSESSMENT — ENCOUNTER SYMPTOMS
NERVOUS/ANXIOUS: 1
SHORTNESS OF BREATH: 0

## 2018-07-07 NOTE — ED AVS SNAPSHOT
[facility], Emergency Department    [address] [address]    Phone:  [phone]                                       Elina Hood   MRN: [id number]    Department:  [facility], Emergency Department   Date of Visit:  7/7/2018           Patient Information     Date Of Birth          1980        Your diagnoses for this visit were:     Hypertension, unspecified type     Atypical chest pain        You were seen by Go Bro MD.        Discharge Instructions       Your EKG and blood tests for heart injury are normal.  Take your blood pressure medication as prescribed and follow-up in your clinic  Return if any problems or concerns or worsening symptoms    24 Hour Appointment Hotline       To make an appointment at any [place] clinic, call [phone]). If you don't have a family doctor or clinic, we will help you find one. [place] clinics are conveniently located to serve the needs of you and your family.             Review of your medicines      Our records show that you are taking the medicines listed below. If these are incorrect, please call your family doctor or clinic.        Dose / Directions Last dose taken    albuterol 1.25 MG/3ML nebulizer solution   Commonly known as:  ACCUNEB   Dose:  1 vial        Take 1 vial by nebulization every 6 hours as needed for shortness of breath / dyspnea or wheezing   Refills:  0        COLACE PO   Dose:  50 mg        Take 50 mg by mouth   Refills:  0        HYDROcodone-acetaminophen 5-325 MG per tablet   Commonly known as:  NORCO   Dose:  1-2 tablet   Quantity:  15 tablet        Take 1-2 tablets by mouth every 6 hours as needed for moderate to severe pain   Refills:  0        IBUPROFEN PO   Dose:  400 mg        Take 400 mg by mouth   Refills:  0        loratadine 10 MG tablet   Commonly known as:  CLARITIN   Dose:  10 mg        Take 10 mg by mouth daily   Refills:  0        naproxen 500 MG tablet   Commonly  known as:  NAPROSYN   Dose:  500 mg   Quantity:  30 tablet        Take 1 tablet (500 mg) by mouth 2 times daily as needed for moderate pain   Refills:  0        OMEPRAZOLE PO        Refills:  0        OXYCODONE HCL PO   Dose:  10 mg        Take 10 mg by mouth   Refills:  0        senna-docusate 8.6-50 MG per tablet   Commonly known as:  SENOKOT-S;PERICOLACE   Dose:  2 tablet   Quantity:  120 tablet        Take 2 tablets by mouth 2 times daily   Refills:  1                Procedures and tests performed during your visit     EKG 12-lead, tracing only    Troponin I      Orders Needing Specimen Collection     None      Pending Results     Date and Time Order Name Status Description    7/7/2018 2058 EKG 12-lead, tracing only Preliminary             Pending Culture Results     No orders found from 7/5/2018 to 7/8/2018.            Pending Results Instructions     If you had any lab results that were not finalized at the time of your Discharge, you can call the ED Lab Result RN at [phone]. You will be contacted by this team for any positive Lab results or changes in treatment. The nurses are available 7 days a week from 10A to 6:30P.  You can leave a message 24 hours per day and they will return your call.        Thank you for choosing [facility]       Thank you for choosing [facility] for your care. Our goal is always to provide you with excellent care. Hearing back from our patients is one way we can continue to improve our services. Please take a few minutes to complete the written survey that you may receive in the mail after you visit with us. Thank you!        [employer] Information     [facility] gives you secure access to your electronic health record. If you see a primary care provider, you can also send messages to your care team and make appointments. If you have questions, please call your primary care clinic.  If you do not have a primary care provider, please call [phone] and they will assist you.        Care  EveryWhere ID     This is your Care EveryWhere ID. This could be used by other organizations to access your [place] medical records  [id number]        Equal Access to Services     EUFEMIA BLOUNT : Pina Hernandez, lakeshia gutierrez, natali fermin, dennys zapata. So [facility] [phone].    ATENCIÓN: Si habla español, tiene a hendricks disposición servicios gratuitos de asistencia lingüística. Llame al [phone].    We comply with applicable federal civil rights laws and [place] laws. We do not discriminate on the basis of race, color, national origin, age, disability, sex, sexual orientation, or gender identity.            After Visit Summary       This is your record. Keep this with you and show to your community pharmacist(s) and doctor(s) at your next visit.

## 2018-07-08 LAB — INTERPRETATION ECG - MUSE: NORMAL

## 2018-07-08 NOTE — ED TRIAGE NOTES
"Pt drove to ER for evaluation of chest pain times 2 days. Per, \"it started getting worse 3 hours ago, I think my blood pressure is high  Because the neck pain and headache. First I thought it was heart burn. toke my med and didn't help.\"  "

## 2018-07-08 NOTE — ED PROVIDER NOTES
History   No chief complaint on file.    The history is provided by the patient.     Elina Hood is a 37 year old female with a history of HTN, GERD, and inflammatory polyneuropathy who presents to the Emergency Department for evaluation of sharp intermittent right chest pain that began two hours ago. Predominantly parasternal. Over the past hour she has had chest pain that comes every two minutes and lasts for 20s.She reports history of rib pain, and chronic pain. She reports anxiety for the past two days and is unsure if it is worse but denies chest pain during episodes of anxiety in the past. She reports that she has SOB when she is anxious. She denies SOB and reports some cough over the past few days.The patient reports that she thought the chest pain was secondary to GERD so she took medications to relieve her symptoms and it helped. S/p cholecystectomy. She denies history of diabetes or dyspnea. She denies tobacco use. She has not taken her high blood pressure medications.      I have reviewed the Medications, Allergies, Past Medical and Surgical History, and Social History in the [facility] system.  Past Medical History:   Diagnosis Date     Allergic state      GERD (gastroesophageal reflux disease)      Hypertension        History reviewed. No pertinent surgical history.    No family history on file.    Social History   Substance Use Topics     Smoking status: Never Smoker     Smokeless tobacco: Never Used     Alcohol use Yes       No current facility-administered medications for this encounter.      Current Outpatient Prescriptions   Medication     albuterol (ACCUNEB) 1.25 MG/3ML nebulizer solution     Docusate Sodium (COLACE PO)     HYDROcodone-acetaminophen (NORCO) 5-325 MG per tablet     IBUPROFEN PO     loratadine (CLARITIN) 10 MG tablet     naproxen (NAPROSYN) 500 MG tablet     OMEPRAZOLE PO     OXYCODONE HCL PO     senna-docusate (SENOKOT-S;PERICOLACE) 8.6-50 MG per tablet        Allergies  "  Allergen Reactions     Dilaudid [Hydromorphone] Other (See Comments)     Paranoid feeling        Review of Systems   Respiratory: Negative for shortness of breath.    Cardiovascular: Positive for chest pain (parasternal).   Psychiatric/Behavioral: The patient is nervous/anxious.    All other systems reviewed and are negative.      Physical Exam   BP: (!) 202/126  Heart Rate: 100  Temp: 98.8  F (37.1  C)  Resp: 15  Height: 165.1 cm (5' 5\")  Weight: 95.3 kg (210 lb)  SpO2: 98 %      Physical Exam   Constitutional: She is oriented to person, place, and time. She appears well-developed and well-nourished. No distress.   HENT:   Head: Normocephalic and atraumatic.   Neck: Neck supple.   Cardiovascular: Normal rate, regular rhythm, normal heart sounds and intact distal pulses.    No murmur heard.  Pulmonary/Chest: Effort normal and breath sounds normal. No respiratory distress. She has no wheezes. She has no rales.   Abdominal: Soft.   Musculoskeletal: Normal range of motion. She exhibits no edema or tenderness.        Right lower leg: Normal.        Left lower leg: Normal.   Neurological: She is alert and oriented to person, place, and time.   Skin: Skin is warm and dry. She is not diaphoretic.   Psychiatric: She has a normal mood and affect. Her behavior is normal.   Nursing note and vitals reviewed.      ED Course     ED Course     Procedures             EKG Interpretation:      Interpreted by Go Bro  Time reviewed: 2110  Symptoms at time of EKG: sharp CP   Rhythm: normal sinus   Rate: normal  Axis: normal  Ectopy: none  Conduction: normal  ST Segments/ T Waves: No ST-T wave changes  Q Waves: none  Comparison to prior: Unchanged from 2017    Clinical Impression: normal EKG                 Labs Ordered and Resulted from Time of ED Arrival Up to the Time of Departure from the ED   TROPONIN I            Assessments & Plan (with Medical Decision Making)   37 year old pleasant female presents with acute " intermitent sharp chest discomfort. No risk factors for PE. Certainly very atypical for ACS.  She has a normal EKG, negative troponin. Biggest concern is that she has poorly controlled blood pressure. She does go to clinic. She is currently on three medications. Her lungs were clear on exam. No symptoms of pneumonia. Doesn't seem to be pleuritic. She has no calf pain or tenderness. She does struggle somewhat with anxiety, her biggest concern was that she was having a heart attack. At this point we've given her reassurance. I wouldn't do anything additional. We did speak at length about the importance of controlling her bloodpressure and the long term consequences of poorly controlled bloodpressure. She seems satisfied and will be discharged home.     This part of the medical record was transcribed by Jaimie Ferro, Medical Scribe, from a dictation done by Dr. Bro.      I have reviewed the nursing notes.    I have reviewed the findings, diagnosis, plan and need for follow up with the patient.    Discharge Medication List as of 7/7/2018 10:22 PM          Final diagnoses:   Hypertension, unspecified type   Atypical chest pain   I, Jaimie Ferro, am serving as a trained medical scribe to document services personally performed by Go Bro MD, based on the provider's statements to me.   IGo MD, was physically present and have reviewed and verified the accuracy of this note documented by Jaimie Ferro.      7/7/2018   [facility], [place], EMERGENCY DEPARTMENT     Go Bro MD  07/08/18 0020

## 2018-07-08 NOTE — DISCHARGE INSTRUCTIONS
Your EKG and blood tests for heart injury are normal.  Take your blood pressure medication as prescribed and follow-up in your clinic  Return if any problems or concerns or worsening symptoms

## 2018-08-14 PROBLEM — S93.409A ANKLE SPRAIN: Status: RESOLVED | Noted: 2018-01-17 | Resolved: 2018-08-14

## 2018-08-14 NOTE — PROGRESS NOTES
Subjective:  HPI                    Objective:  System    Physical Exam    General     ROS    Assessment/Plan:    DISCHARGE REPORT    Progress reporting period is from 1/17/18 to 3/6/18. Patient did not return after 3/6/18 to complete POC, thus full DC status is unknown. Please refer to subjective and objective reports below from last visit on 3/6/18 for DC information.       SUBJECTIVE  Subjective: tried testing out walking in her heels and this went ok, still sore though. does feel it is getting better. sore today from walking in the shoes.     Current Pain level: 5/10.      Initial Pain level: 6/10.   Changes in function:  Yes (See Goal flowsheet attached for changes in current functional level)  Adverse reaction to treatment or activity: None    OBJECTIVE  Changes noted in objective findings:  Yes  Objective: SL balance 10 sec. improved tandem walking balance stability. reports slight pinching/pain at anterior ankle     ASSESSMENT/PLAN  Updated problem list and treatment plan: Diagnosis 1:  Left Lateral Ankle Sprain    STG/LTGs have been met or progress has been made towards goals:  Yes (See Goal flow sheet completed today.)  Assessment of Progress: The patient's condition is improving.  Self Management Plans:  Patient has been instructed in a home treatment program.  Patient  has been instructed in self management of symptoms.    Elina continues to require the following intervention to meet STG and LTG's:  PT was still required at time of last visit to complete POC, but patient did not return.    Recommendations:  DC patient from PT at this time.    Please refer to the daily flowsheet for treatment today, total treatment time and time spent performing 1:1 timed codes.

## 2018-10-18 ENCOUNTER — HOSPITAL ENCOUNTER (INPATIENT)
Facility: CLINIC | Age: 38
LOS: 2 days | Discharge: HOME | DRG: 305 | End: 2018-10-20
Attending: EMERGENCY MEDICINE | Admitting: INTERNAL MEDICINE
Payer: COMMERCIAL

## 2018-10-18 ENCOUNTER — APPOINTMENT (OUTPATIENT)
Dept: MRI IMAGING | Facility: CLINIC | Age: 38
DRG: 305 | End: 2018-10-18
Attending: EMERGENCY MEDICINE
Payer: COMMERCIAL

## 2018-10-18 ENCOUNTER — APPOINTMENT (OUTPATIENT)
Dept: CT IMAGING | Facility: CLINIC | Age: 38
DRG: 305 | End: 2018-10-18
Attending: EMERGENCY MEDICINE
Payer: COMMERCIAL

## 2018-10-18 DIAGNOSIS — I16.0 HYPERTENSIVE URGENCY: Primary | ICD-10-CM

## 2018-10-18 DIAGNOSIS — I10 ESSENTIAL HYPERTENSION, MALIGNANT: ICD-10-CM

## 2018-10-18 DIAGNOSIS — G43.101 MIGRAINE WITH AURA AND WITH STATUS MIGRAINOSUS, NOT INTRACTABLE: ICD-10-CM

## 2018-10-18 PROBLEM — R29.898 WEAKNESS OF RIGHT UPPER EXTREMITY: Status: ACTIVE | Noted: 2018-10-18

## 2018-10-18 LAB
ALBUMIN UR-MCNC: NEGATIVE MG/DL
ANION GAP SERPL CALCULATED.3IONS-SCNC: 6 MMOL/L (ref 3–14)
APPEARANCE UR: CLEAR
APTT PPP: 27 SEC (ref 22–37)
BACTERIA #/AREA URNS HPF: ABNORMAL /HPF
BILIRUB UR QL STRIP: NEGATIVE
BUN SERPL-MCNC: 10 MG/DL (ref 7–30)
CA-I BLD-SCNC: 5.1 MG/DL (ref 4.4–5.2)
CALCIUM SERPL-MCNC: 9.4 MG/DL (ref 8.5–10.1)
CHLORIDE SERPL-SCNC: 106 MMOL/L (ref 94–109)
CO2 BLDCOV-SCNC: 32 MMOL/L (ref 21–28)
CO2 SERPL-SCNC: 27 MMOL/L (ref 20–32)
COLOR UR AUTO: ABNORMAL
CREAT SERPL-MCNC: 0.79 MG/DL (ref 0.52–1.04)
CREAT SERPL-MCNC: 0.84 MG/DL (ref 0.52–1.04)
ERYTHROCYTE [DISTWIDTH] IN BLOOD BY AUTOMATED COUNT: 13.2 % (ref 10–15)
GFR SERPL CREATININE-BSD FRML MDRD: 75 ML/MIN/1.7M2
GFR SERPL CREATININE-BSD FRML MDRD: 81 ML/MIN/1.7M2
GLUCOSE BLD-MCNC: 94 MG/DL (ref 70–99)
GLUCOSE SERPL-MCNC: 92 MG/DL (ref 70–99)
GLUCOSE UR STRIP-MCNC: NEGATIVE MG/DL
HCT VFR BLD AUTO: 42.8 % (ref 35–47)
HCT VFR BLD CALC: 42 %PCV (ref 35–47)
HGB BLD CALC-MCNC: 14.3 G/DL (ref 11.7–15.7)
HGB BLD-MCNC: 13.8 G/DL (ref 11.7–15.7)
HGB UR QL STRIP: NEGATIVE
INR PPP: 0.89 (ref 0.86–1.14)
INTERPRETATION ECG - MUSE: NORMAL
KETONES UR STRIP-MCNC: NEGATIVE MG/DL
LEUKOCYTE ESTERASE UR QL STRIP: NEGATIVE
MAGNESIUM SERPL-MCNC: 2 MG/DL (ref 1.6–2.3)
MCH RBC QN AUTO: 27.9 PG (ref 26.5–33)
MCHC RBC AUTO-ENTMCNC: 32.2 G/DL (ref 31.5–36.5)
MCV RBC AUTO: 87 FL (ref 78–100)
NITRATE UR QL: NEGATIVE
PCO2 BLDV: 49 MM HG (ref 40–50)
PH BLDV: 7.42 PH (ref 7.32–7.43)
PH UR STRIP: 7.5 PH (ref 5–7)
PHOSPHATE SERPL-MCNC: 2.3 MG/DL (ref 2.5–4.5)
PLATELET # BLD AUTO: 209 10E9/L (ref 150–450)
PLATELET # BLD AUTO: 238 10E9/L (ref 150–450)
PO2 BLDV: 29 MM HG (ref 25–47)
POTASSIUM BLD-SCNC: 3.7 MMOL/L (ref 3.4–5.3)
POTASSIUM SERPL-SCNC: 3.4 MMOL/L (ref 3.4–5.3)
RBC # BLD AUTO: 4.95 10E12/L (ref 3.8–5.2)
RBC #/AREA URNS AUTO: <1 /HPF (ref 0–2)
SAO2 % BLDV FROM PO2: 55 %
SODIUM BLD-SCNC: 142 MMOL/L (ref 133–144)
SODIUM SERPL-SCNC: 140 MMOL/L (ref 133–144)
SOURCE: ABNORMAL
SP GR UR STRIP: 1.01 (ref 1–1.03)
SQUAMOUS #/AREA URNS AUTO: 1 /HPF (ref 0–1)
TROPONIN I SERPL-MCNC: <0.015 UG/L (ref 0–0.04)
UROBILINOGEN UR STRIP-MCNC: NORMAL MG/DL (ref 0–2)
WBC # BLD AUTO: 7.9 10E9/L (ref 4–11)
WBC #/AREA URNS AUTO: <1 /HPF (ref 0–5)

## 2018-10-18 PROCEDURE — 85027 COMPLETE CBC AUTOMATED: CPT | Performed by: EMERGENCY MEDICINE

## 2018-10-18 PROCEDURE — 36415 COLL VENOUS BLD VENIPUNCTURE: CPT | Performed by: PHYSICIAN ASSISTANT

## 2018-10-18 PROCEDURE — 25500064 ZZH RX 255 OP 636: Performed by: EMERGENCY MEDICINE

## 2018-10-18 PROCEDURE — 12000001 ZZH R&B MED SURG/OB UMMC

## 2018-10-18 PROCEDURE — 99222 1ST HOSP IP/OBS MODERATE 55: CPT | Mod: AI | Performed by: INTERNAL MEDICINE

## 2018-10-18 PROCEDURE — 99207 ZZC APP CREDIT; MD BILLING SHARED VISIT: CPT | Performed by: PHYSICIAN ASSISTANT

## 2018-10-18 PROCEDURE — 93005 ELECTROCARDIOGRAM TRACING: CPT | Performed by: EMERGENCY MEDICINE

## 2018-10-18 PROCEDURE — 70553 MRI BRAIN STEM W/O & W/DYE: CPT

## 2018-10-18 PROCEDURE — 82565 ASSAY OF CREATININE: CPT | Performed by: PHYSICIAN ASSISTANT

## 2018-10-18 PROCEDURE — 25000128 H RX IP 250 OP 636: Performed by: EMERGENCY MEDICINE

## 2018-10-18 PROCEDURE — 40000498 ZZHCL STATISTIC POTASSIUM ED POCT

## 2018-10-18 PROCEDURE — 96375 TX/PRO/DX INJ NEW DRUG ADDON: CPT | Performed by: EMERGENCY MEDICINE

## 2018-10-18 PROCEDURE — 40000501 ZZHCL STATISTIC HEMATOCRIT ED POCT

## 2018-10-18 PROCEDURE — 99285 EMERGENCY DEPT VISIT HI MDM: CPT | Mod: 25 | Performed by: EMERGENCY MEDICINE

## 2018-10-18 PROCEDURE — 40000497 ZZHCL STATISTIC SODIUM ED POCT

## 2018-10-18 PROCEDURE — 82803 BLOOD GASES ANY COMBINATION: CPT

## 2018-10-18 PROCEDURE — 99291 CRITICAL CARE FIRST HOUR: CPT | Mod: 25 | Performed by: EMERGENCY MEDICINE

## 2018-10-18 PROCEDURE — 93010 ELECTROCARDIOGRAM REPORT: CPT | Mod: Z6 | Performed by: EMERGENCY MEDICINE

## 2018-10-18 PROCEDURE — 84100 ASSAY OF PHOSPHORUS: CPT | Performed by: EMERGENCY MEDICINE

## 2018-10-18 PROCEDURE — 81001 URINALYSIS AUTO W/SCOPE: CPT | Performed by: EMERGENCY MEDICINE

## 2018-10-18 PROCEDURE — 96372 THER/PROPH/DIAG INJ SC/IM: CPT | Mod: 59 | Performed by: EMERGENCY MEDICINE

## 2018-10-18 PROCEDURE — 25000128 H RX IP 250 OP 636: Performed by: PHYSICIAN ASSISTANT

## 2018-10-18 PROCEDURE — 80048 BASIC METABOLIC PNL TOTAL CA: CPT | Performed by: EMERGENCY MEDICINE

## 2018-10-18 PROCEDURE — 70450 CT HEAD/BRAIN W/O DYE: CPT

## 2018-10-18 PROCEDURE — 85610 PROTHROMBIN TIME: CPT | Performed by: EMERGENCY MEDICINE

## 2018-10-18 PROCEDURE — 85025 COMPLETE CBC W/AUTO DIFF WBC: CPT | Performed by: EMERGENCY MEDICINE

## 2018-10-18 PROCEDURE — 85730 THROMBOPLASTIN TIME PARTIAL: CPT | Performed by: EMERGENCY MEDICINE

## 2018-10-18 PROCEDURE — 40000502 ZZHCL STATISTIC GLUCOSE ED POCT

## 2018-10-18 PROCEDURE — 25000132 ZZH RX MED GY IP 250 OP 250 PS 637: Performed by: PHYSICIAN ASSISTANT

## 2018-10-18 PROCEDURE — 96376 TX/PRO/DX INJ SAME DRUG ADON: CPT | Performed by: EMERGENCY MEDICINE

## 2018-10-18 PROCEDURE — 82330 ASSAY OF CALCIUM: CPT

## 2018-10-18 PROCEDURE — 83735 ASSAY OF MAGNESIUM: CPT | Performed by: EMERGENCY MEDICINE

## 2018-10-18 PROCEDURE — 85049 AUTOMATED PLATELET COUNT: CPT | Performed by: PHYSICIAN ASSISTANT

## 2018-10-18 PROCEDURE — 25000125 ZZHC RX 250: Performed by: EMERGENCY MEDICINE

## 2018-10-18 PROCEDURE — 84484 ASSAY OF TROPONIN QUANT: CPT | Performed by: EMERGENCY MEDICINE

## 2018-10-18 PROCEDURE — A9585 GADOBUTROL INJECTION: HCPCS | Performed by: EMERGENCY MEDICINE

## 2018-10-18 PROCEDURE — 96374 THER/PROPH/DIAG INJ IV PUSH: CPT | Mod: 59 | Performed by: EMERGENCY MEDICINE

## 2018-10-18 PROCEDURE — 25000132 ZZH RX MED GY IP 250 OP 250 PS 637: Performed by: EMERGENCY MEDICINE

## 2018-10-18 RX ORDER — OLANZAPINE 10 MG/2ML
2.5 INJECTION, POWDER, FOR SOLUTION INTRAMUSCULAR ONCE
Status: COMPLETED | OUTPATIENT
Start: 2018-10-18 | End: 2018-10-18

## 2018-10-18 RX ORDER — HYDRALAZINE HYDROCHLORIDE 10 MG/1
10 TABLET, FILM COATED ORAL 4 TIMES DAILY PRN
Status: DISCONTINUED | OUTPATIENT
Start: 2018-10-18 | End: 2018-10-19

## 2018-10-18 RX ORDER — ASPIRIN 325 MG
325 TABLET ORAL ONCE
Status: COMPLETED | OUTPATIENT
Start: 2018-10-18 | End: 2018-10-18

## 2018-10-18 RX ORDER — CARVEDILOL 6.25 MG/1
12.5 TABLET ORAL 2 TIMES DAILY WITH MEALS
Status: DISCONTINUED | OUTPATIENT
Start: 2018-10-18 | End: 2018-10-19

## 2018-10-18 RX ORDER — CARVEDILOL 12.5 MG/1
12.5 TABLET ORAL 2 TIMES DAILY WITH MEALS
Status: ON HOLD | COMMUNITY
End: 2018-10-20

## 2018-10-18 RX ORDER — ASPIRIN 325 MG
325 TABLET ORAL DAILY PRN
COMMUNITY

## 2018-10-18 RX ORDER — ONDANSETRON 2 MG/ML
4 INJECTION INTRAMUSCULAR; INTRAVENOUS EVERY 6 HOURS PRN
Status: DISCONTINUED | OUTPATIENT
Start: 2018-10-18 | End: 2018-10-20 | Stop reason: HOSPADM

## 2018-10-18 RX ORDER — ONDANSETRON 4 MG/1
4 TABLET, ORALLY DISINTEGRATING ORAL EVERY 6 HOURS PRN
Status: DISCONTINUED | OUTPATIENT
Start: 2018-10-18 | End: 2018-10-20 | Stop reason: HOSPADM

## 2018-10-18 RX ORDER — CETIRIZINE HYDROCHLORIDE 10 MG/1
10 TABLET ORAL DAILY PRN
COMMUNITY

## 2018-10-18 RX ORDER — ACETAMINOPHEN 500 MG
1000 TABLET ORAL ONCE
Status: COMPLETED | OUTPATIENT
Start: 2018-10-18 | End: 2018-10-18

## 2018-10-18 RX ORDER — NALOXONE HYDROCHLORIDE 0.4 MG/ML
.1-.4 INJECTION, SOLUTION INTRAMUSCULAR; INTRAVENOUS; SUBCUTANEOUS
Status: DISCONTINUED | OUTPATIENT
Start: 2018-10-18 | End: 2018-10-20 | Stop reason: HOSPADM

## 2018-10-18 RX ORDER — AMOXICILLIN 250 MG
2 CAPSULE ORAL 2 TIMES DAILY PRN
Status: DISCONTINUED | OUTPATIENT
Start: 2018-10-18 | End: 2018-10-20 | Stop reason: HOSPADM

## 2018-10-18 RX ORDER — LORAZEPAM 2 MG/ML
1 INJECTION INTRAMUSCULAR ONCE
Status: COMPLETED | OUTPATIENT
Start: 2018-10-18 | End: 2018-10-18

## 2018-10-18 RX ORDER — ACETAMINOPHEN 325 MG/1
650 TABLET ORAL EVERY 4 HOURS PRN
Status: DISCONTINUED | OUTPATIENT
Start: 2018-10-18 | End: 2018-10-19

## 2018-10-18 RX ORDER — LOSARTAN POTASSIUM AND HYDROCHLOROTHIAZIDE 12.5; 1 MG/1; MG/1
1 TABLET ORAL DAILY
COMMUNITY
End: 2022-05-26

## 2018-10-18 RX ORDER — ONDANSETRON 2 MG/ML
4 INJECTION INTRAMUSCULAR; INTRAVENOUS EVERY 30 MIN PRN
Status: DISCONTINUED | OUTPATIENT
Start: 2018-10-18 | End: 2018-10-18 | Stop reason: CLARIF

## 2018-10-18 RX ORDER — METOCLOPRAMIDE HYDROCHLORIDE 5 MG/ML
10 INJECTION INTRAMUSCULAR; INTRAVENOUS ONCE
Status: COMPLETED | OUTPATIENT
Start: 2018-10-18 | End: 2018-10-18

## 2018-10-18 RX ORDER — LOSARTAN POTASSIUM AND HYDROCHLOROTHIAZIDE 12.5; 1 MG/1; MG/1
1 TABLET ORAL DAILY
Status: DISCONTINUED | OUTPATIENT
Start: 2018-10-19 | End: 2018-10-20 | Stop reason: HOSPADM

## 2018-10-18 RX ORDER — IBUPROFEN 200 MG
400 TABLET ORAL EVERY 4 HOURS PRN
COMMUNITY

## 2018-10-18 RX ORDER — AMOXICILLIN 250 MG
1 CAPSULE ORAL 2 TIMES DAILY PRN
Status: DISCONTINUED | OUTPATIENT
Start: 2018-10-18 | End: 2018-10-20 | Stop reason: HOSPADM

## 2018-10-18 RX ORDER — GADOBUTROL 604.72 MG/ML
10 INJECTION INTRAVENOUS ONCE
Status: COMPLETED | OUTPATIENT
Start: 2018-10-18 | End: 2018-10-18

## 2018-10-18 RX ORDER — LORAZEPAM 2 MG/ML
0.5 INJECTION INTRAMUSCULAR ONCE
Status: DISCONTINUED | OUTPATIENT
Start: 2018-10-18 | End: 2018-10-18 | Stop reason: CLARIF

## 2018-10-18 RX ADMIN — PROCHLORPERAZINE EDISYLATE 10 MG: 5 INJECTION INTRAMUSCULAR; INTRAVENOUS at 14:13

## 2018-10-18 RX ADMIN — ONDANSETRON 4 MG: 2 INJECTION INTRAMUSCULAR; INTRAVENOUS at 09:18

## 2018-10-18 RX ADMIN — OLANZAPINE 2.5 MG: 10 INJECTION, POWDER, LYOPHILIZED, FOR SOLUTION INTRAMUSCULAR at 12:36

## 2018-10-18 RX ADMIN — OLANZAPINE 2.5 MG: 10 INJECTION, POWDER, LYOPHILIZED, FOR SOLUTION INTRAMUSCULAR at 15:06

## 2018-10-18 RX ADMIN — NICARDIPINE HYDROCHLORIDE 2.5 MG/HR: 0.2 INJECTION, SOLUTION INTRAVENOUS at 08:45

## 2018-10-18 RX ADMIN — METOCLOPRAMIDE 10 MG: 5 INJECTION, SOLUTION INTRAMUSCULAR; INTRAVENOUS at 09:21

## 2018-10-18 RX ADMIN — GADOBUTROL 10 ML: 604.72 INJECTION INTRAVENOUS at 11:42

## 2018-10-18 RX ADMIN — ENOXAPARIN SODIUM 40 MG: 40 INJECTION SUBCUTANEOUS at 20:06

## 2018-10-18 RX ADMIN — CARVEDILOL 12.5 MG: 6.25 TABLET, FILM COATED ORAL at 20:05

## 2018-10-18 RX ADMIN — ASPIRIN 325 MG ORAL TABLET 325 MG: 325 PILL ORAL at 10:09

## 2018-10-18 RX ADMIN — LORAZEPAM 1 MG: 2 INJECTION INTRAMUSCULAR; INTRAVENOUS at 10:44

## 2018-10-18 RX ADMIN — ACETAMINOPHEN 1000 MG: 500 TABLET, FILM COATED ORAL at 14:12

## 2018-10-18 ASSESSMENT — VISUAL ACUITY
OU: NORMAL ACUITY

## 2018-10-18 ASSESSMENT — ACTIVITIES OF DAILY LIVING (ADL): ADLS_ACUITY_SCORE: 11
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
## 2018-10-18 NOTE — PHARMACY
Pharmacy Stroke Code Response  Pharmacist responded as part of the Stroke Code Team activation to patient care area ED.  The Stroke Team determined that the patient was not a candidate for IV alteplase therapy and the pharmacy team was dismissed at 0830.

## 2018-10-18 NOTE — ED NOTES
[facility], [place]   ED Nurse to Floor Handoff     Elina Hood is a 37 year old female who speaks English and lives alone,  in a home  They arrived in the ED by car from home    ED Chief Complaint: One-sided Weakness    ED Dx;   Final diagnoses:   ACS (acute coronary syndrome) (H)         Needed?: No    Allergies:   Allergies   Allergen Reactions     Dilaudid [Hydromorphone] Other (See Comments)     Paranoid feeling    .  Past Medical Hx:   Past Medical History:   Diagnosis Date     Allergic state      GERD (gastroesophageal reflux disease)      Hypertension       Baseline Mental status: WDL  Current Mental Status changes: at basesline    Infection present or suspected this encounter: no  Sepsis suspected: No  Isolation type: No active isolations     Activity level - Baseline/Home:  Independent  Activity Level - Current:   Stand with Assist    Bariatric equipment needed?: No    In the ED these meds were given:   Medications   niCARdipine 40 mg in 200 mL 0.9% NaCl (CARDENE) infusion (0 mg/hr Intravenous Stopped 10/18/18 1330)   ondansetron (ZOFRAN) injection 4 mg (4 mg Intravenous Given 10/18/18 0918)   aspirin tablet 325 mg (325 mg Oral Given 10/18/18 1009)   metoclopramide (REGLAN) injection 10 mg (10 mg Intravenous Given 10/18/18 0921)   gadobutrol (GADAVIST) injection 10 mL (10 mLs Intravenous Given 10/18/18 1142)   LORazepam (ATIVAN) injection 1 mg (1 mg Intravenous Given 10/18/18 1044)   OLANZapine (zyPREXA) injection 2.5 mg (2.5 mg Intramuscular Given 10/18/18 1236)   acetaminophen (TYLENOL) tablet 1,000 mg (1,000 mg Oral Given 10/18/18 1412)   prochlorperazine (COMPAZINE) injection 10 mg (10 mg Intravenous Given 10/18/18 1413)       Drips running?  No    Home pump  No    Current LDAs       Labs results:   Labs Ordered and Resulted from Time of ED Arrival Up to the Time of Departure from the ED   PHOSPHORUS - Abnormal; Notable for the following:        Result  Value    Phosphorus 2.3 (*)     All other components within normal limits   ROUTINE UA WITH MICROSCOPIC - Abnormal; Notable for the following:     pH Urine 7.5 (*)     Bacteria Urine Few (*)     All other components within normal limits   ISTAT GASES ELEC ICA GLUC JOSÉ MIGUEL POCT - Abnormal; Notable for the following:     Bicarbonate Venous 32 (*)     All other components within normal limits   GLUCOSE MONITOR NURSING POCT   BASIC METABOLIC PANEL   CBC WITH PLATELETS   INR   MAGNESIUM   PARTIAL THROMBOPLASTIN TIME   TROPONIN I   VITAL SIGNS AND NEURO CHECKS   ACTIVITY   PULSE OXIMETRY NURSING   ASSESSMENT   NOTIFY   ISTAT INR NURSING POCT   ISTAT TROPONIN NURSING POCT   NEURO CHECKS       Imaging Studies:   Recent Results (from the past 24 hour(s))   CT Head w/o Contrast    Narrative    CT HEAD W/O CONTRAST 10/18/2018 8:47 AM    Provided History: Evaluate for dissection/thromboembolism;     Comparison: No comparison available.    Technique: Using multidetector thin collimation helical acquisition  technique, axial, coronal and sagittal CT images from the skull base  to the vertex were obtained without intravenous contrast.     Findings:    No intracranial hemorrhage, mass effect, or midline shift. The  ventricles are proportionate to the cerebral sulci. The gray to white  matter differentiation of the cerebral hemispheres is preserved. The  basal cisterns are patent.    Mild parasinusoidal  mucosal thickening. The mastoid air cells are  clear.     Right orbital prosthesis.      Impression    Impression:  1. No acute intracranial pathology.  2. Mild pansinusitis.    I have personally reviewed the examination and initial interpretation  and I agree with the findings.    HARLEEN GLYNN MD   MR Brain for Stroke Cmpl w/o & w Contras    Narrative    MRI brain without and with contrast  MRA of the head without contrast  Neck MRA without and with contrast    History:  R sided deficit; .  ICD-10:  Comparison:  Head CT  10/18/2018 same day      Technique:   Brain MRI:  Axial diffusion, FLAIR, T2-weighted, susceptibility, and  coronal T1-weighted images were obtained without intravenous contrast.  Following intravenous gadolinium-based contrast administration, axial  and coronal T1-weighted images were obtained.    Head MRA: 3D time-of-flight MRA of the [place] was performed  without intravenous contrast.  Neck MRA:  Limited non contrast 2DTOF images were obtained of the  mid-cervical region. Following intravenous gadolinium-based contrast  administration, a contrast enhanced MRA of the neck/cervical vessels  was performed.  Three-dimensional reconstructions of the neck and head MRA were  created, which were reviewed by the radiologist.    Dose: 10 mls Gadavist    Findings:   Brain MRI: Axial diffusion weighted images demonstrate no definite  acute infarct. On the FLAIR images, there is nonspecific mild  periventricular T2-hyperintensity, which are most likely related to  early chronic small vessel ischemic disease, 4-5 punctate foci total.  There is no definite intracranial hemorrhage on susceptibility images.  Ventricles are proportionate to the cerebral sulci. Within the  maxillary and ethmoid sinuses there are mild inflammatory findings as  noted on the recent CT. There is also an incidental right lobe  prosthesis as previously. Contrast-enhanced images of the brain  demonstrate no abnormal intra- or extra-axial enhancement.    Head MRA demonstrates no definite aneurysm or stenosis of the major  intracranial arteries. The anterior communicating artery is patent.  Regarding the posterior communicating arteries, both are patent.    Neck MRA demonstrates patent major cervical arteries. Antegrade flow  in the major cervical vasculature.      Impression    Impression:  1. No evidence of acute infarction or intracranial hemorrhage.  2. No abnormal enhancing lesions intracranially.  3. Head MRA demonstrates no definite  "aneurysm or stenosis of the major  intracranial arteries.  4. Neck MRA demonstrates patent major cervical arteries.  5. 4-5 punctate foci in the white matter, most likely related to early  findings of chronic small vessel ischemic disease-facet lock.      HARLEEN GLYNN MD       Recent vital signs:   BP (!) 166/116  Pulse 94  Temp 98  F (36.7  C) (Oral)  Resp 13  Ht 1.651 m (5' 5\")  Wt 109.5 kg (241 lb 4.8 oz)  LMP  (LMP Unknown)  SpO2 99%  BMI 40.15 kg/m2    Cardiac Rhythm: Normal Sinus  Pt needs tele? Yes  Skin/wound Issues: None    Code Status: not documented    Pain control: fair    Nausea control: good    Abnormal labs/tests/findings requiring intervention: none    Family present during ED course? No   Family Comments/Social Situation comments: none    Tasks needing completion: None    Michael Rae RN    [phone] [facility] ED    "

## 2018-10-18 NOTE — PHARMACY-ADMISSION MEDICATION HISTORY
Admission medication history interview status for the 10/18/2018 admission is complete. See Epic admission navigator for allergy information, pharmacy, prior to admission medications and immunization status.     Medication history interview sources:  patient, [facility] pharmacy    Changes made to PTA medication list (reason)  Added: losartan/hctz, coreg, asa, cetirizine  Deleted: norco, oxycodone, naproxen, loratadine, docusate  Changed: ibuprofen, sennadoc to prn    Additional medication history information (including reliability of information, actions taken by pharmacist): medication history was obtained with patient in the ED. Patient was able to recognize medication names, but did not know them outright. Losartan/HCTZ, carvedilol, cetirizine, and omeprazole were confirmed with pharmacy which states that she has not picked up her BP meds since June.   -patient seems to have poor compliance.   -patient states she uses aspirin daily for pain, she states she thinks it is full strength aspirin      Prior to Admission medications    Medication Sig Last Dose Taking? Auth Provider   aspirin 325 MG tablet Take 325 mg by mouth daily  Yes Unknown, Entered By History   carvedilol (COREG) 12.5 MG tablet Take 12.5 mg by mouth 2 times daily (with meals)  Yes Unknown, Entered By History   cetirizine (ZYRTEC) 10 MG tablet Take 10 mg by mouth daily as needed for allergies  Yes Unknown, Entered By History   ibuprofen (ADVIL/MOTRIN) 200 MG tablet Take 400 mg by mouth every 4 hours as needed for mild pain  Yes Unknown, Entered By History   losartan-hydrochlorothiazide (HYZAAR) 100-12.5 MG per tablet Take 1 tablet by mouth daily  Yes Unknown, Entered By History   omeprazole (PRILOSEC) 20 MG CR capsule Take 20 mg by mouth daily as needed (heartburn)  Yes Unknown, Entered By History   albuterol (ACCUNEB) 1.25 MG/3ML nebulizer solution Take 1 vial by nebulization every 6 hours as needed for shortness of breath / dyspnea or wheezing    Reported, Patient   senna-docusate (SENOKOT-S;PERICOLACE) 8.6-50 MG per tablet Take 2 tablets by mouth 2 times daily  Patient taking differently: Take 2 tablets by mouth 2 times daily as needed for constipation    Can Mccarthy MD         Medication history completed by: Jerman Paulino [facility] on 10/18/2018 at 3:04 PM

## 2018-10-18 NOTE — ED TRIAGE NOTES
"Elina is brought to room #2 for a stroke code due to complaints of right arm weakness, word-finding problems, right eye drooping, \"can't hold my spoon\" and blurry vision with a HA. Assessments of grasp does reveal a weaker grasp on the right hand. Patient tells me, \"since 0600.\"  "

## 2018-10-18 NOTE — CONSULTS
[facility], [place]     Neurology Stroke Code    Patient Name: Elina Hood  : 1980   MRN#: [id number]    STROKE DATA     Stroke Code:  Time called:  10/18/2018 0820  Time patient seen:  10/18/2018 0823  Onset of symptoms:  10/18/2018 030  Last known normal (pt's baseline):  10/18/2018 030  Head CT read by Dr. Gastelum at:  10/18/2018 0833  Stroke Code de-escalated at 10/18/2018 0834 after discussion with Dr. RYAN Mata due to symptoms not likely caused by stroke and patient is outside emergent treatment time parameters.      TPA treatment:  Not given due to outside the time window.    National Institutes of Health Stroke Scale (at presentation)  NIHSS done at:  time patient seen      Score    Level of consciousness:  (0)   Alert, keenly responsive    LOC questions:  (0)   Answers both questions correctly    LOC commands:  (0)   Performs both tasks correctly    Best gaze:  (0)   Normal    Visual:  (0)   No visual loss    Facial palsy:  (0)   Normal symmetrical movements    Motor arm (left):  (0)   No drift    Motor arm (right):  (1)   Drift    Motor leg (left):  (0)   No drift    Motor leg (right):  (1)   Drift    Limb ataxia:  (0)   Absent    Sensory:  (1)   Mild sensory deficit.    Best language:  (0)   Normal- no aphasia    Dysarthria:  (0)   Normal    Extinction and inattention:  (0)   No abnormality        NIHSS Total Score:  3         ASSESSMENT & RECOMMENDATONS     Stroke code activated due to Right arm numbness/weakness and severe headache.    Ms. Hood is a 37 y.o. F with history of HTN, migraine, and right sided weakness who presented as stroke code for worsening right sided weakness. She woke at 3 AM with migraine headache, right sided tingling, worsening right sided weakness and possibly word finding difficulty. This morning, she dropped her spoon out of her hand, which was concerning to her. She has dropped items out of right hand, but usually milk jug or  heavier objects. On arrival, she was noted to be significantly hypertensive to 219/164. She takes combination hydrochlorothiazide-?(losartan) and carvedilol for hypertension at baseline and reports good compliance.     Exam was positive for mild drift in right arm and leg, headache, and reduced sensation right side hemibody. CT head was negative for hemorrhage. Differential included hypertensive emergency, stroke, demyelinating disease versus other intracranial pathology. MRI brain with and without and MRA head/neck were recommended.     MRI and MRA was performed in the ED which did not show any evidence of acute infarct or hemorrhage, no abnormal enhancement, no aneurysm or stenosis, patent cervical vessels. Suspect symptoms worsened in the setting of hypertensive urgency.    Recommendations:   - BP management as per ED team  - Long term BP goal < 140/90  - Recommend to set up care and follow up with Neurology as an outpatient for management of migraine headaches  - Counseled the patient regarding acute stroke and presenting to the ED if she develops any new neurological symptoms  - No further stroke workup needed at this time    Stroke team will sign off. Please call us with any questions. The patient was discussed with the Fellow, Dr. Gastelum.  The staff is Dr. Mata.    Leslee Nicholas, LAKIA CNP     Clau Gastelum MD  Vascular Neurology fellow  Pager # [phone]

## 2018-10-18 NOTE — IP AVS SNAPSHOT
[address] [address]    Phone:  [phone]                                       After Visit Summary   10/18/2018    Elina Hood    MRN: [id number]           After Visit Summary Signature Page     I have received my discharge instructions, and my questions have been answered. I have discussed any challenges I see with this plan with the nurse or doctor.    ..........................................................................................................................................  Patient/Patient Representative Signature      ..........................................................................................................................................  Patient Representative Print Name and Relationship to Patient    ..................................................               ................................................  Date                                   Time    ..........................................................................................................................................  Reviewed by Signature/Title    ...................................................              ..............................................  Date                                               Time          22EPIC Rev 08/18
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
## 2018-10-18 NOTE — ED NOTES
Stroke Code was de-escalated at 0830 by Dr Santoro. Change vitals and neuro checks to Q1hr per MD verbal order.

## 2018-10-18 NOTE — IP AVS SNAPSHOT
MRN:[id number]                      After Visit Summary   10/18/2018    Elina Hood    MRN: [id number]           Thank you!     Thank you for choosing [facility] for your care. Our goal is always to provide you with excellent care. Hearing back from our patients is one way we can continue to improve our services. Please take a few minutes to complete the written survey that you may receive in the mail after you visit with us. Thank you!        Patient Information     Date Of Birth          1980        Designated Caregiver       Most Recent Value    Caregiver    Will someone help with your care after discharge? yes    Name of designated caregiver Giovany    Phone number of caregiver [phone]    Caregiver address same as pt      About your hospital stay     You were admitted on:  October 18, 2018 You last received care in the:  [address] [facility]    You were discharged on:  October 20, 2018        Reason for your hospital stay       You were admitted for elevated blood pressures and migraines                  Who to Call     For medical emergencies, please call 911.  For non-urgent questions about your medical care, please call your primary care provider or clinic, [phone]          Attending Provider     Provider Specialty    Yonathan Santoro MD Emergency Medicine    [facility], Mohsen Wang MD Emergency Medicine    Mely Tejada DO Internal Medicine    Zoraida Romero MD Internal Medicine       Primary Care Provider Office Phone # Fax #    Marleen Santiago [phone]      After Care Instructions     Activity       Your activity upon discharge: activity as tolerated            Diet       Follow this diet upon discharge: Orders Placed This Encounter      Combination Diet Regular Diet Adult                  Follow-up Appointments     Adult [facility] Follow-up and recommended labs and tests       Follow up with primary care provider, Marleen Santiago,  "within 7 days for hospital follow- up.  The following labs/tests are recommended: BMP to check renal function .      Appointments on [place] and/or [facility] (with [facility] or [facility] provider or service). Call [phone] if you haven't heard regarding these appointments within 7 days of discharge.                  Additional Services     NEUROLOGY ADULT REFERRAL       For carpal tunnel and for migraines     Your provider has referred you for the following:   Consult at [facility]: Neurology Clinic - [place] [phone]   [email or web address]  General Neurology    Please be aware that coverage of these services is subject to the terms and limitations of your health insurance plan.  Call member services at your health plan with any benefit or coverage questions.      Please bring the following with you to your appointment:    (1) Any X-Rays, CTs or MRIs which have been performed.  Contact the facility where they were done to arrange for  prior to your scheduled appointment.    (2) List of current medications  (3) This referral request   (4) Any documents/labs given to you for this referral                  Pending Results     No orders found from 10/16/2018 to 10/19/2018.            Statement of Approval     Ordered          10/20/18 0806  I have reviewed and agree with all the recommendations and orders detailed in this document.  EFFECTIVE NOW     Approved and electronically signed by:  Zoraida Romero MD             Admission Information     Date & Time Provider Department Dept. Phone    10/18/2018 Zoraida Romero MD [address] [facility] [phone]      Your Vitals Were     Blood Pressure Pulse Temperature Respirations Height Weight    163/99 (BP Location: Right arm) 95 97  F (36.1  C) (Oral) 18 1.651 m (5' 5\") 109.5 kg (241 lb 4.8 oz)    Last Period Pulse Oximetry BMI (Body Mass Index)             (LMP Unknown) 100% 40.15 kg/m2         MyChart Information     " [facility] gives you secure access to your electronic health record. If you see a primary care provider, you can also send messages to your care team and make appointments. If you have questions, please call your primary care clinic.  If you do not have a primary care provider, please call [phone] and they will assist you.        Care EveryWhere ID     This is your Care EveryWhere ID. This could be used by other organizations to access your [facility] medical records  [id number]        Equal Access to Services     EUFEMIA BLOUNT : Hadii aad ku hadasho Soomaali, waaxda luqadaha, qaybta kaalmada adeegyada, waxay quentin zapata. So [facility] [phone].    ATENCIÓN: Si habla español, tiene a hendricks disposición servicios gratuitos de asistencia lingüística. [facility] [phone].    We comply with applicable federal civil rights laws and [place] laws. We do not discriminate on the basis of race, color, national origin, age, disability, sex, sexual orientation, or gender identity.               Review of your medicines      CONTINUE these medicines which may have CHANGED, or have new prescriptions. If we are uncertain of the size of tablets/capsules you have at home, strength may be listed as something that might have changed.        Dose / Directions    carvedilol 25 MG tablet   Commonly known as:  COREG   This may have changed:    - medication strength  - how much to take  - when to take this   Used for:  Migraine with aura and with status migrainosus, not intractable, Hypertensive urgency        Dose:  25 mg   Take 1 tablet (25 mg) by mouth 2 times daily   Quantity:  60 tablet   Refills:  0       senna-docusate 8.6-50 MG per tablet   Commonly known as:  SENOKOT-S;PERICOLACE   This may have changed:    - when to take this  - reasons to take this        Dose:  2 tablet   Take 2 tablets by mouth 2 times daily   Quantity:  120 tablet   Refills:  1         CONTINUE these medicines which have NOT CHANGED         Dose / Directions    albuterol 1.25 MG/3ML nebulizer solution   Commonly known as:  ACCUNEB        Dose:  1 vial   Take 1 vial by nebulization every 6 hours as needed for shortness of breath / dyspnea or wheezing   Refills:  0       aspirin 325 MG tablet        Dose:  325 mg   Take 325 mg by mouth daily   Refills:  0       cetirizine 10 MG tablet   Commonly known as:  zyrTEC        Dose:  10 mg   Take 10 mg by mouth daily as needed for allergies   Refills:  0       ibuprofen 200 MG tablet   Commonly known as:  ADVIL/MOTRIN        Dose:  400 mg   Take 400 mg by mouth every 4 hours as needed for mild pain   Refills:  0       losartan-hydrochlorothiazide 100-12.5 MG per tablet   Commonly known as:  HYZAAR        Dose:  1 tablet   Take 1 tablet by mouth daily   Refills:  0       omeprazole 20 MG CR capsule   Commonly known as:  priLOSEC        Dose:  20 mg   Take 20 mg by mouth daily as needed (heartburn)   Refills:  0            Where to get your medicines      These medications were sent to [facility] [address] [address]     Phone:  [phone]     carvedilol 25 MG tablet                Protect others around you: Learn how to safely use, store and throw away your medicines at www.disposemymeds.org.             Medication List: This is a list of all your medications and when to take them. Check marks below indicate your daily home schedule. Keep this list as a reference.      Medications           Morning Afternoon Evening Bedtime As Needed    albuterol 1.25 MG/3ML nebulizer solution   Commonly known as:  ACCUNEB   Take 1 vial by nebulization every 6 hours as needed for shortness of breath / dyspnea or wheezing                                aspirin 325 MG tablet   Take 325 mg by mouth daily   Last time this was given:  325 mg on 10/18/2018 10:09 AM                                carvedilol 25 MG tablet   Commonly known as:  COREG    Take 1 tablet (25 mg) by mouth 2 times daily   Last time this was given:  25 mg on 10/20/2018  7:57 AM                                cetirizine 10 MG tablet   Commonly known as:  zyrTEC   Take 10 mg by mouth daily as needed for allergies                                ibuprofen 200 MG tablet   Commonly known as:  ADVIL/MOTRIN   Take 400 mg by mouth every 4 hours as needed for mild pain                                losartan-hydrochlorothiazide 100-12.5 MG per tablet   Commonly known as:  HYZAAR   Take 1 tablet by mouth daily   Last time this was given:  1 tablet on 10/20/2018  7:57 AM                                omeprazole 20 MG CR capsule   Commonly known as:  priLOSEC   Take 20 mg by mouth daily as needed (heartburn)   Last time this was given:  20 mg on 10/19/2018  2:48 PM                                senna-docusate 8.6-50 MG per tablet   Commonly known as:  SENOKOT-S;PERICOLACE   Take 2 tablets by mouth 2 times daily                                          More Information        Controlling High Blood Pressure  High blood pressure (hypertension) is often called the silent killer. This is because many people who have it don t know it. High blood pressure can raise your risk of heart attack, stroke, and heart failure. Controlling your blood pressure can decrease your risk of these problems. Know your blood pressure and remember to check it regularly. Doing so can save your life.  Blood pressure measurements are given as 2 numbers. Systolic blood pressure is the upper number. This is the pressure when the heart contracts. Diastolic blood pressure is the lower number. This is the pressure when the heart relaxes between beats.  Blood pressure is categorized as normal, elevated, or stage 1 or stage 2 high blood pressure:    Normal blood pressure is systolic of less than 120 and diastolic of less than 80 (120/80)    Elevated blood pressure is systolic of 120 to 129 and diastolic less than 80    Stage 1 high  blood pressure is systolic is 130 to 139 or diastolic between 80 to 89    Stage 2 high blood pressure is when systolic is 140 or higher or the diastolic is 90 or higher  Here are some things you can do to help control your blood pressure.    Choose heart-healthy foods    Select low-salt, low-fat foods. Limit sodium intake to 2,400 mg per day or the amount suggested by your healthcare provider.    Limit canned, dried, cured, packaged, and fast foods. These can contain a lot of salt.    Eat 8 to 10 servings of fruits and vegetables every day.    Choose lean meats, fish, or chicken.    Eat whole-grain pasta, brown rice, and beans.    Eat 2 to 3 servings of low-fat or fat-free dairy products.    Ask your doctor about the DASH eating plan. This plan helps reduce blood pressure.    When you go to a restaurant, ask that your meal be prepared with no added salt.  Maintain a healthy weight    Ask your healthcare provider how many calories to eat a day. Then stick to that number.    Ask your healthcare provider what weight range is healthiest for you. If you are overweight, a weight loss of only 3% to 5% of your body weight can help lower blood pressure. Generally, a good weight loss goal is to lose 10% of your body weight in a year.    Limit snacks and sweets.    Get regular exercise.  Get up and get active    Choose activities you enjoy. Find ones you can do with friends or family. This includes bicycling, dancing, walking, and jogging.    Park farther away from building entrances.    Use stairs instead of the elevator.    When you can, walk or bike instead of driving.    [place] leaves, garden, or do household repairs.    Be active at a moderate to vigorous level of physical activity for at least 40 minutes for a minimum of 3 to 4 days a week.   Manage stress    Make time to relax and enjoy life. Find time to laugh.    Communicate your concerns with your loved ones and your healthcare provider.    Visit with family and  friends, and keep up with hobbies.  Limit alcohol and quit smoking    Men should have no more than 2 drinks per day.    Women should have no more than 1 drink per day.    Talk with your healthcare provider about quitting smoking. Smoking significantly increases your risk for heart disease and stroke. Ask your healthcare provider about community smoking cessation programs and other options.  Medicines  If lifestyle changes aren t enough, your healthcare provider may prescribe high blood pressure medicine. Take all medicines as prescribed. If you have any questions about your medicines, ask your healthcare provider before stopping or changing them.   Date Last Reviewed: 4/27/2016 2000-2017 [employer]. [address] [place] [address], [place] [address]. All rights reserved. This information is not intended as a substitute for professional medical care. Always follow your healthcare professional's instructions.

## 2018-10-19 ENCOUNTER — APPOINTMENT (OUTPATIENT)
Dept: ULTRASOUND IMAGING | Facility: CLINIC | Age: 38
DRG: 305 | End: 2018-10-19
Attending: PHYSICIAN ASSISTANT
Payer: COMMERCIAL

## 2018-10-19 LAB
ANION GAP SERPL CALCULATED.3IONS-SCNC: 9 MMOL/L (ref 3–14)
BUN SERPL-MCNC: 13 MG/DL (ref 7–30)
CALCIUM SERPL-MCNC: 9.3 MG/DL (ref 8.5–10.1)
CHLORIDE SERPL-SCNC: 106 MMOL/L (ref 94–109)
CO2 SERPL-SCNC: 26 MMOL/L (ref 20–32)
CREAT SERPL-MCNC: 0.96 MG/DL (ref 0.52–1.04)
ERYTHROCYTE [DISTWIDTH] IN BLOOD BY AUTOMATED COUNT: 13.3 % (ref 10–15)
GFR SERPL CREATININE-BSD FRML MDRD: 65 ML/MIN/1.7M2
GLUCOSE SERPL-MCNC: 95 MG/DL (ref 70–99)
HCT VFR BLD AUTO: 38.8 % (ref 35–47)
HGB BLD-MCNC: 12.3 G/DL (ref 11.7–15.7)
MCH RBC QN AUTO: 27.6 PG (ref 26.5–33)
MCHC RBC AUTO-ENTMCNC: 31.7 G/DL (ref 31.5–36.5)
MCV RBC AUTO: 87 FL (ref 78–100)
PLATELET # BLD AUTO: 250 10E9/L (ref 150–450)
POTASSIUM SERPL-SCNC: 3.8 MMOL/L (ref 3.4–5.3)
RBC # BLD AUTO: 4.46 10E12/L (ref 3.8–5.2)
SODIUM SERPL-SCNC: 141 MMOL/L (ref 133–144)
WBC # BLD AUTO: 7.2 10E9/L (ref 4–11)

## 2018-10-19 PROCEDURE — 99233 SBSQ HOSP IP/OBS HIGH 50: CPT | Performed by: HOSPITALIST

## 2018-10-19 PROCEDURE — 25000128 H RX IP 250 OP 636: Performed by: INTERNAL MEDICINE

## 2018-10-19 PROCEDURE — 25000132 ZZH RX MED GY IP 250 OP 250 PS 637: Performed by: PHYSICIAN ASSISTANT

## 2018-10-19 PROCEDURE — 25000132 ZZH RX MED GY IP 250 OP 250 PS 637: Performed by: HOSPITALIST

## 2018-10-19 PROCEDURE — 85027 COMPLETE CBC AUTOMATED: CPT | Performed by: PHYSICIAN ASSISTANT

## 2018-10-19 PROCEDURE — 25000128 H RX IP 250 OP 636: Performed by: PHYSICIAN ASSISTANT

## 2018-10-19 PROCEDURE — 90686 IIV4 VACC NO PRSV 0.5 ML IM: CPT | Performed by: INTERNAL MEDICINE

## 2018-10-19 PROCEDURE — 36415 COLL VENOUS BLD VENIPUNCTURE: CPT | Performed by: PHYSICIAN ASSISTANT

## 2018-10-19 PROCEDURE — 93971 EXTREMITY STUDY: CPT | Mod: RT

## 2018-10-19 PROCEDURE — 12000001 ZZH R&B MED SURG/OB UMMC

## 2018-10-19 PROCEDURE — 80048 BASIC METABOLIC PNL TOTAL CA: CPT | Performed by: PHYSICIAN ASSISTANT

## 2018-10-19 RX ORDER — HYDRALAZINE HYDROCHLORIDE 25 MG/1
25 TABLET, FILM COATED ORAL 4 TIMES DAILY PRN
Status: DISCONTINUED | OUTPATIENT
Start: 2018-10-19 | End: 2018-10-20 | Stop reason: HOSPADM

## 2018-10-19 RX ORDER — CARVEDILOL 6.25 MG/1
12.5 TABLET ORAL ONCE
Status: COMPLETED | OUTPATIENT
Start: 2018-10-19 | End: 2018-10-19

## 2018-10-19 RX ORDER — ACETAMINOPHEN 325 MG/1
975 TABLET ORAL EVERY 8 HOURS
Status: DISCONTINUED | OUTPATIENT
Start: 2018-10-19 | End: 2018-10-20 | Stop reason: HOSPADM

## 2018-10-19 RX ORDER — CARVEDILOL 25 MG/1
25 TABLET ORAL 2 TIMES DAILY
Status: DISCONTINUED | OUTPATIENT
Start: 2018-10-19 | End: 2018-10-20 | Stop reason: HOSPADM

## 2018-10-19 RX ORDER — LANOLIN ALCOHOL/MO/W.PET/CERES
3 CREAM (GRAM) TOPICAL
Status: DISCONTINUED | OUTPATIENT
Start: 2018-10-19 | End: 2018-10-20 | Stop reason: HOSPADM

## 2018-10-19 RX ORDER — CARVEDILOL 25 MG/1
25 TABLET ORAL 2 TIMES DAILY WITH MEALS
Status: DISCONTINUED | OUTPATIENT
Start: 2018-10-19 | End: 2018-10-19

## 2018-10-19 RX ORDER — HYDRALAZINE HYDROCHLORIDE 25 MG/1
25 TABLET, FILM COATED ORAL 4 TIMES DAILY PRN
Status: DISCONTINUED | OUTPATIENT
Start: 2018-10-19 | End: 2018-10-19

## 2018-10-19 RX ORDER — DILTIAZEM HYDROCHLORIDE 30 MG/1
30 TABLET, FILM COATED ORAL EVERY 6 HOURS PRN
Status: DISCONTINUED | OUTPATIENT
Start: 2018-10-19 | End: 2018-10-20 | Stop reason: HOSPADM

## 2018-10-19 RX ADMIN — ENOXAPARIN SODIUM 40 MG: 40 INJECTION SUBCUTANEOUS at 21:08

## 2018-10-19 RX ADMIN — ACETAMINOPHEN 975 MG: 325 TABLET, FILM COATED ORAL at 18:21

## 2018-10-19 RX ADMIN — LOSARTAN POTASSIUM AND HYDROCHLOROTHIAZIDE 1 TABLET: 100; 12.5 TABLET, FILM COATED ORAL at 07:59

## 2018-10-19 RX ADMIN — OMEPRAZOLE 20 MG: 20 CAPSULE, DELAYED RELEASE ORAL at 14:48

## 2018-10-19 RX ADMIN — CARVEDILOL 12.5 MG: 6.25 TABLET, FILM COATED ORAL at 07:59

## 2018-10-19 RX ADMIN — INFLUENZA A VIRUS A/MICHIGAN/45/2015 X-275 (H1N1) ANTIGEN (FORMALDEHYDE INACTIVATED), INFLUENZA A VIRUS A/SINGAPORE/INFIMH-16-0019/2016 IVR-186 (H3N2) ANTIGEN (FORMALDEHYDE INACTIVATED), INFLUENZA B VIRUS B/PHUKET/3073/2013 ANTIGEN (FORMALDEHYDE INACTIVATED), AND INFLUENZA B VIRUS B/MARYLAND/15/2016 BX-69A ANTIGEN (FORMALDEHYDE INACTIVATED) 0.5 ML: 15; 15; 15; 15 INJECTION, SUSPENSION INTRAMUSCULAR at 14:35

## 2018-10-19 RX ADMIN — HYDRALAZINE HYDROCHLORIDE 25 MG: 25 TABLET ORAL at 16:16

## 2018-10-19 RX ADMIN — CARVEDILOL 12.5 MG: 6.25 TABLET, FILM COATED ORAL at 14:36

## 2018-10-19 RX ADMIN — ACETAMINOPHEN 650 MG: 325 TABLET, FILM COATED ORAL at 10:21

## 2018-10-19 RX ADMIN — CARVEDILOL 25 MG: 25 TABLET, FILM COATED ORAL at 21:08

## 2018-10-19 ASSESSMENT — VISUAL ACUITY
OU: NORMAL ACUITY

## 2018-10-19 ASSESSMENT — ACTIVITIES OF DAILY LIVING (ADL)
ADLS_ACUITY_SCORE: 11

## 2018-10-19 NOTE — PLAN OF CARE
Problem: Patient Care Overview  Goal: Plan of Care/Patient Progress Review  Outcome: Improving  Alert and oriented x 4. Denies pain. Up in the room with standby assist. BP monitored overnight. Vital signs stable. For Ultrasound of RLE to r/o DVT. Hypertensive this am, did not meet parameter for prn med. Will continue to monitor and follow plan of care
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
## 2018-10-19 NOTE — PLAN OF CARE
Problem: Patient Care Overview  Goal: Plan of Care/Patient Progress Review  Outcome: No Change  0716-5842: No significant changes. BP elevated over >160/>90. One time dose of Coreg given. BP still elevated one hour later. PRN hydralazine given with improvement in blood pressure. Starting scheduled Tylenol for headache. PIV x 2 saline locked. Pt had a visitor this evening. Wants to discharge early in the morning tomorrow.     Addendum 0623: /79 this am, not meeting parameters for PRN medications. Will continue to monitor.

## 2018-10-19 NOTE — PLAN OF CARE
Problem: Patient Care Overview  Goal: Plan of Care/Patient Progress Review  Outcome: Improving  Time  1272-9104    Pt admitted to 5A from ED for R-sided weakness, HA, and hypertension. Pt alert and oriented x4, VSS except HTN of 160/104. PRN hydralazine available if pressures increase. R sided weakness improved from earlier today but still present. R  strength is decreased. R pronator drift present. Mild HA present. Scans in ED negative for acute brain injury. Neuro checks q 4 hrs. R calf pain present, US ordered to r/o DVT.  Regular diet, ate large evening meal. Voiding WNL, last BM yesterday. PIV x2 SL. Neurology following. Will continue to monitor and follow POC.
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
## 2018-10-20 VITALS
WEIGHT: 241.3 LBS | DIASTOLIC BLOOD PRESSURE: 99 MMHG | HEIGHT: 65 IN | OXYGEN SATURATION: 100 % | BODY MASS INDEX: 40.2 KG/M2 | RESPIRATION RATE: 18 BRPM | TEMPERATURE: 97 F | SYSTOLIC BLOOD PRESSURE: 163 MMHG | HEART RATE: 95 BPM

## 2018-10-20 PROCEDURE — 25000132 ZZH RX MED GY IP 250 OP 250 PS 637: Performed by: HOSPITALIST

## 2018-10-20 PROCEDURE — 25000132 ZZH RX MED GY IP 250 OP 250 PS 637: Performed by: PHYSICIAN ASSISTANT

## 2018-10-20 PROCEDURE — 99239 HOSP IP/OBS DSCHRG MGMT >30: CPT | Performed by: HOSPITALIST

## 2018-10-20 RX ORDER — CARVEDILOL 25 MG/1
25 TABLET ORAL 2 TIMES DAILY
Qty: 60 TABLET | Refills: 0 | Status: SHIPPED | OUTPATIENT
Start: 2018-10-20 | End: 2023-01-25

## 2018-10-20 RX ADMIN — LOSARTAN POTASSIUM AND HYDROCHLOROTHIAZIDE 1 TABLET: 100; 12.5 TABLET, FILM COATED ORAL at 07:57

## 2018-10-20 RX ADMIN — ACETAMINOPHEN 975 MG: 325 TABLET, FILM COATED ORAL at 08:12

## 2018-10-20 RX ADMIN — CARVEDILOL 25 MG: 25 TABLET, FILM COATED ORAL at 07:57

## 2018-10-20 ASSESSMENT — ACTIVITIES OF DAILY LIVING (ADL)
ADLS_ACUITY_SCORE: 11

## 2018-10-20 ASSESSMENT — VISUAL ACUITY
OU: NORMAL ACUITY
OU: NORMAL ACUITY
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
## 2018-10-20 NOTE — PLAN OF CARE
Problem: Patient Care Overview  Goal: Plan of Care/Patient Progress Review  Outcome: Improving  S: 37 year old admitted with migraine and HTN.  Patient complains of numbness and tingling sensations in the hands. Patient complains of generalized right sided weakness.     O: Patient appeared calm, cooperative and in a positive mood.  Patient was eating breakfast and watching television along with texting.    A:Patient had right pronator drift per assigned RN is improving.  Patient had grade 4 for strength on right side and patient had grade 5 on the left side for strength.  Patient VS: BP: 163/99, Pulse: 89 BPM; 18 respirations; 100 O2 stats on room air; temperature of 97 degrees.  Patient complained of headache starting to come on and was treated with medication (please see EMAR). Patient received all scheduled medications.     P: Plan to discharge today. Discharge teaching per assigned RN.   Student Nurse: Tonia Montaño

## 2018-10-20 NOTE — PLAN OF CARE
Problem: Patient Care Overview  Goal: Plan of Care/Patient Progress Review  Outcome: Adequate for Discharge Date Met: 10/20/18  VSS on RA. Hypertension remaining upon discharge. MD aware. A&Ox4. Right sided weakness unchanged. Left hand tingling. MD aware. Additional neuro's WNL. PIV x 2 removed. Pt dressed and packed all belongings. Verbalized understanding of discharge instructions. Declined wheelchair to lobby. Pt walked off unit to  meds from pharmacy and then to ride home.

## 2018-10-20 NOTE — PLAN OF CARE
Problem: Patient Care Overview  Goal: Plan of Care/Patient Progress Review  Outcome: Improving  Pt A&O. VSS on RA, BP controlled 120's/70's overnight, no PRN's needed. HTN education handout given. Denies headache/pain. Declined scheduled tylenol. Neuros unchanged. Probable discharge home today.

## 2018-10-22 ENCOUNTER — PATIENT OUTREACH (OUTPATIENT)
Dept: CARE COORDINATION | Facility: CLINIC | Age: 38
End: 2018-10-22
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
## 2019-04-21 ENCOUNTER — HOSPITAL ENCOUNTER (EMERGENCY)
Facility: CLINIC | Age: 39
Discharge: HOME | End: 2019-04-22
Attending: EMERGENCY MEDICINE | Admitting: EMERGENCY MEDICINE
Payer: COMMERCIAL

## 2019-04-21 ENCOUNTER — APPOINTMENT (OUTPATIENT)
Dept: GENERAL RADIOLOGY | Facility: CLINIC | Age: 39
End: 2019-04-21
Attending: EMERGENCY MEDICINE
Payer: COMMERCIAL

## 2019-04-21 DIAGNOSIS — I10 BENIGN ESSENTIAL HYPERTENSION: ICD-10-CM

## 2019-04-21 DIAGNOSIS — M79.644 PAIN OF RIGHT THUMB: Primary | ICD-10-CM

## 2019-04-21 PROCEDURE — 73140 X-RAY EXAM OF FINGER(S): CPT | Mod: RT

## 2019-04-21 PROCEDURE — 99284 EMERGENCY DEPT VISIT MOD MDM: CPT | Mod: 25 | Performed by: EMERGENCY MEDICINE

## 2019-04-21 PROCEDURE — 96374 THER/PROPH/DIAG INJ IV PUSH: CPT | Performed by: EMERGENCY MEDICINE

## 2019-04-21 PROCEDURE — 99285 EMERGENCY DEPT VISIT HI MDM: CPT | Mod: 25 | Performed by: EMERGENCY MEDICINE

## 2019-04-21 PROCEDURE — 93010 ELECTROCARDIOGRAM REPORT: CPT | Mod: Z6 | Performed by: EMERGENCY MEDICINE

## 2019-04-21 RX ORDER — HYDRALAZINE HYDROCHLORIDE 20 MG/ML
20 INJECTION INTRAMUSCULAR; INTRAVENOUS ONCE
Status: COMPLETED | OUTPATIENT
Start: 2019-04-21 | End: 2019-04-22

## 2019-04-21 ASSESSMENT — ENCOUNTER SYMPTOMS: HEADACHES: 1

## 2019-04-21 ASSESSMENT — MIFFLIN-ST. JEOR: SCORE: 1668.79

## 2019-04-21 NOTE — ED AVS SNAPSHOT
[facility], [place], Emergency Department  [address] [place] [id number]  Phone:  [phone]                                    Elina Hood   MRN: [id number]    Department:  [facility], Emergency Department   Date of Visit:  4/21/2019           After Visit Summary Signature Page    I have received my discharge instructions, and my questions have been answered. I have discussed any challenges I see with this plan with the nurse or doctor.    ..........................................................................................................................................  Patient/Patient Representative Signature      ..........................................................................................................................................  Patient Representative Print Name and Relationship to Patient    ..................................................               ................................................  Date                                   Time    ..........................................................................................................................................  Reviewed by Signature/Title    ...................................................              ..............................................  Date                                               Time          22EPIC Rev 08/18

## 2019-04-22 VITALS
HEART RATE: 115 BPM | SYSTOLIC BLOOD PRESSURE: 163 MMHG | OXYGEN SATURATION: 100 % | TEMPERATURE: 97.9 F | BODY MASS INDEX: 36.65 KG/M2 | HEIGHT: 65 IN | DIASTOLIC BLOOD PRESSURE: 101 MMHG | RESPIRATION RATE: 18 BRPM | WEIGHT: 220 LBS

## 2019-04-22 LAB
ALBUMIN SERPL-MCNC: 3.3 G/DL (ref 3.4–5)
ALP SERPL-CCNC: 94 U/L (ref 40–150)
ALT SERPL W P-5'-P-CCNC: 21 U/L (ref 0–50)
ANION GAP SERPL CALCULATED.3IONS-SCNC: 6 MMOL/L (ref 3–14)
AST SERPL W P-5'-P-CCNC: 16 U/L (ref 0–45)
BASOPHILS # BLD AUTO: 0.1 10E9/L (ref 0–0.2)
BASOPHILS NFR BLD AUTO: 0.4 %
BILIRUB SERPL-MCNC: 0.2 MG/DL (ref 0.2–1.3)
BUN SERPL-MCNC: 14 MG/DL (ref 7–30)
CALCIUM SERPL-MCNC: 9.1 MG/DL (ref 8.5–10.1)
CHLORIDE SERPL-SCNC: 105 MMOL/L (ref 94–109)
CO2 SERPL-SCNC: 28 MMOL/L (ref 20–32)
CREAT SERPL-MCNC: 0.79 MG/DL (ref 0.52–1.04)
DIFFERENTIAL METHOD BLD: ABNORMAL
EOSINOPHIL # BLD AUTO: 0.3 10E9/L (ref 0–0.7)
EOSINOPHIL NFR BLD AUTO: 2.4 %
ERYTHROCYTE [DISTWIDTH] IN BLOOD BY AUTOMATED COUNT: 13.2 % (ref 10–15)
GFR SERPL CREATININE-BSD FRML MDRD: >90 ML/MIN/{1.73_M2}
GLUCOSE SERPL-MCNC: 99 MG/DL (ref 70–99)
HCT VFR BLD AUTO: 37.3 % (ref 35–47)
HGB BLD-MCNC: 11.9 G/DL (ref 11.7–15.7)
IMM GRANULOCYTES # BLD: 0 10E9/L (ref 0–0.4)
IMM GRANULOCYTES NFR BLD: 0.2 %
INTERPRETATION ECG - MUSE: NORMAL
LYMPHOCYTES # BLD AUTO: 3.1 10E9/L (ref 0.8–5.3)
LYMPHOCYTES NFR BLD AUTO: 27.8 %
MCH RBC QN AUTO: 27.7 PG (ref 26.5–33)
MCHC RBC AUTO-ENTMCNC: 31.9 G/DL (ref 31.5–36.5)
MCV RBC AUTO: 87 FL (ref 78–100)
MONOCYTES # BLD AUTO: 1.1 10E9/L (ref 0–1.3)
MONOCYTES NFR BLD AUTO: 9.8 %
NEUTROPHILS # BLD AUTO: 6.6 10E9/L (ref 1.6–8.3)
NEUTROPHILS NFR BLD AUTO: 59.4 %
NRBC # BLD AUTO: 0 10*3/UL
NRBC BLD AUTO-RTO: 0 /100
PLATELET # BLD AUTO: 264 10E9/L (ref 150–450)
POTASSIUM SERPL-SCNC: 3.1 MMOL/L (ref 3.4–5.3)
PROT SERPL-MCNC: 7.8 G/DL (ref 6.8–8.8)
RBC # BLD AUTO: 4.29 10E12/L (ref 3.8–5.2)
SODIUM SERPL-SCNC: 139 MMOL/L (ref 133–144)
TROPONIN I SERPL-MCNC: <0.015 UG/L (ref 0–0.04)
WBC # BLD AUTO: 11.2 10E9/L (ref 4–11)

## 2019-04-22 PROCEDURE — 85025 COMPLETE CBC W/AUTO DIFF WBC: CPT | Performed by: EMERGENCY MEDICINE

## 2019-04-22 PROCEDURE — 80053 COMPREHEN METABOLIC PANEL: CPT | Performed by: EMERGENCY MEDICINE

## 2019-04-22 PROCEDURE — 25000128 H RX IP 250 OP 636: Performed by: EMERGENCY MEDICINE

## 2019-04-22 PROCEDURE — 84484 ASSAY OF TROPONIN QUANT: CPT | Performed by: EMERGENCY MEDICINE

## 2019-04-22 RX ORDER — HYDRALAZINE HYDROCHLORIDE 10 MG/1
10 TABLET, FILM COATED ORAL 3 TIMES DAILY
Qty: 30 TABLET | Refills: 0 | Status: SHIPPED | OUTPATIENT
Start: 2019-04-22 | End: 2019-10-03

## 2019-04-22 RX ORDER — KETOROLAC TROMETHAMINE 30 MG/ML
30 INJECTION, SOLUTION INTRAMUSCULAR; INTRAVENOUS ONCE
Status: COMPLETED | OUTPATIENT
Start: 2019-04-22 | End: 2019-04-22

## 2019-04-22 RX ADMIN — KETOROLAC TROMETHAMINE 30 MG: 30 INJECTION, SOLUTION INTRAMUSCULAR at 00:32

## 2019-04-22 RX ADMIN — HYDRALAZINE HYDROCHLORIDE 20 MG: 20 INJECTION INTRAMUSCULAR; INTRAVENOUS at 00:29

## 2019-04-22 NOTE — DISCHARGE INSTRUCTIONS
Follow-up with your primary care provider for further management of your blood pressure.  Referral has been made for you with orthopedic surgery.  They should contact you to set up an appointment for further evaluation of your thumb.  Please return the emergency department for any new or worsening symptoms as needed.

## 2019-04-22 NOTE — ED PROVIDER NOTES
[place] EMERGENCY DEPARTMENT ([facility])  April 21, 2019    History     Chief Complaint   Patient presents with     Neck Pain     HPI  Elina Hood is a 38 year old female with history notable for HTN and GERD who presents to the ED for R thumb pain.  Patient states that since January she has difficulty bending her R thumb but denies any recent related injury.  She states that today after bowling her R thumb pain became worse.  Patient also states that she has had ongoing headache and right shoulder pain which she rates at 7/10.  She also states that she had chest pain 2 days ago's and also felt lightheaded, but currently does not have chest pain.  She states she has been taking her antihypertensive besides Coreg which she stopped because she thought it was causing her R thumb pain.    PAST MEDICAL HISTORY  Past Medical History:   Diagnosis Date     Allergic state      GERD (gastroesophageal reflux disease)      Hypertension      PAST SURGICAL HISTORY  History reviewed. No pertinent surgical history.  FAMILY HISTORY  History reviewed. No pertinent family history.  SOCIAL HISTORY  Social History     Tobacco Use     Smoking status: Never Smoker     Smokeless tobacco: Never Used   Substance Use Topics     Alcohol use: Yes     Comment: rarely      MEDICATIONS  No current facility-administered medications for this encounter.      Current Outpatient Medications   Medication     albuterol (ACCUNEB) 1.25 MG/3ML nebulizer solution     aspirin 325 MG tablet     cetirizine (ZYRTEC) 10 MG tablet     hydrALAZINE (APRESOLINE) 10 MG tablet     ibuprofen (ADVIL/MOTRIN) 200 MG tablet     losartan-hydrochlorothiazide (HYZAAR) 100-12.5 MG per tablet     omeprazole (PRILOSEC) 20 MG CR capsule     carvedilol (COREG) 25 MG tablet     ALLERGIES  No Known Allergies      I have reviewed the Medications, Allergies, Past Medical and Surgical History, and Social History in the Epic system.    Review of Systems  "  Cardiovascular: Negative for chest pain.   Musculoskeletal:        Positive for R thumb pain and R shoulder pain   Neurological: Positive for headaches.   All other systems reviewed and are negative.      Physical Exam   BP: (!) 199/130  Pulse: 94  Temp: 98.5  F (36.9  C)  Resp: 18  Height: 165.1 cm (5' 5\")  Weight: 99.8 kg (220 lb)  SpO2: 99 %      Physical Exam   Constitutional: No distress.   HENT:   Head: Atraumatic.   Mouth/Throat: Oropharynx is clear and moist. No oropharyngeal exudate.   Eyes: EOM are normal. No scleral icterus.   Neck: Neck supple.   Cardiovascular: Regular rhythm and normal heart sounds.   Tachycardia   Pulmonary/Chest: Breath sounds normal. No respiratory distress.   Abdominal: Soft. She exhibits no distension. There is no tenderness.   Musculoskeletal: She exhibits no edema or deformity.   Right thumb unable to flex at IP joint, she is able to flex at MCP joint.   Neurological: She is alert.   Skin: Skin is warm and dry. She is not diaphoretic.   Psychiatric: She has a normal mood and affect. Her behavior is normal.       ED Course        Procedures             EKG Interpretation:      Interpreted by Job Cho  Time reviewed: 2350  Symptoms at time of EKG: Headache  Rhythm: normal sinus   Rate: normal  Axis: normal  Ectopy: none  Conduction: normal  ST Segments/ T Waves: No ST-T wave changes  Q Waves: none  Comparison to prior: Unchanged    Clinical Impression: normal EKG         Results for orders placed or performed during the hospital encounter of 04/21/19   XR Finger Right G/E 2 Views    Impression    Impression:  1.  No acute osseous pathology.  2.  Relatively dense, sclerotic appearance of the sesamoid bone of the  interphalangeal joint of the first digit. Could represent normal  variant versus reactive change. Can consider nonemergent follow-up MRI  of the thumb to further characterize.   CBC with platelets differential   Result Value Ref Range    WBC 11.2 (H) 4.0 - " 11.0 10e9/L    RBC Count 4.29 3.8 - 5.2 10e12/L    Hemoglobin 11.9 11.7 - 15.7 g/dL    Hematocrit 37.3 35.0 - 47.0 %    MCV 87 78 - 100 fl    MCH 27.7 26.5 - 33.0 pg    MCHC 31.9 31.5 - 36.5 g/dL    RDW 13.2 10.0 - 15.0 %    Platelet Count 264 150 - 450 10e9/L    Diff Method Automated Method     % Neutrophils 59.4 %    % Lymphocytes 27.8 %    % Monocytes 9.8 %    % Eosinophils 2.4 %    % Basophils 0.4 %    % Immature Granulocytes 0.2 %    Nucleated RBCs 0 0 /100    Absolute Neutrophil 6.6 1.6 - 8.3 10e9/L    Absolute Lymphocytes 3.1 0.8 - 5.3 10e9/L    Absolute Monocytes 1.1 0.0 - 1.3 10e9/L    Absolute Eosinophils 0.3 0.0 - 0.7 10e9/L    Absolute Basophils 0.1 0.0 - 0.2 10e9/L    Abs Immature Granulocytes 0.0 0 - 0.4 10e9/L    Absolute Nucleated RBC 0.0    Comprehensive metabolic panel   Result Value Ref Range    Sodium 139 133 - 144 mmol/L    Potassium 3.1 (L) 3.4 - 5.3 mmol/L    Chloride 105 94 - 109 mmol/L    Carbon Dioxide 28 20 - 32 mmol/L    Anion Gap 6 3 - 14 mmol/L    Glucose 99 70 - 99 mg/dL    Urea Nitrogen 14 7 - 30 mg/dL    Creatinine 0.79 0.52 - 1.04 mg/dL    GFR Estimate >90 >60 mL/min/[1.73_m2]    GFR Estimate If Black >90 >60 mL/min/[1.73_m2]    Calcium 9.1 8.5 - 10.1 mg/dL    Bilirubin Total 0.2 0.2 - 1.3 mg/dL    Albumin 3.3 (L) 3.4 - 5.0 g/dL    Protein Total 7.8 6.8 - 8.8 g/dL    Alkaline Phosphatase 94 40 - 150 U/L    ALT 21 0 - 50 U/L    AST 16 0 - 45 U/L   Troponin I   Result Value Ref Range    Troponin I ES <0.015 0.000 - 0.045 ug/L   EKG 12-lead, tracing only   Result Value Ref Range    Interpretation ECG Click View Image link to view waveform and result        Labs Ordered and Resulted from Time of ED Arrival Up to the Time of Departure from the ED   CBC WITH PLATELETS DIFFERENTIAL - Abnormal; Notable for the following components:       Result Value    WBC 11.2 (*)     All other components within normal limits   COMPREHENSIVE METABOLIC PANEL - Abnormal; Notable for the following  components:    Potassium 3.1 (*)     Albumin 3.3 (*)     All other components within normal limits   TROPONIN I   PERIPHERAL IV CATHETER            Assessments & Plan (with Medical Decision Making)   38-year-old female presents to us with a chief complaint of thumb pain.  She also notes some headache and neck pain she associates with her blood pressure being elevated.  She has had similar symptoms in the past year elevated blood pressure.  She notes she stopped her Coreg because she did not think it was working correctly.  Her labs today are relatively benign.  She is a slight leukocytosis but a normal CMP and troponin.  She is not currently anemic.  Patient has not previously been on hydralazine for blood pressure.  20 mg of hydralazine dramatically improved her blood pressure here.  This is the result of improving her neck and head pain.  We will give her a prescription for p.o. hydralazine at home.  In terms of her thumb her x-ray reveals a abnormal sesamoid at the joint but this likely accounts for her inability to flex the IP joint.  We will put a referral for orthopedics and she will follow-up with them for her thumb.  Patient will follow-up with her primary care for blood pressure.  Her symptoms are currently improved and she is comfortable discharge home and the plan.    I have reviewed the nursing notes.    I have reviewed the findings, diagnosis, plan and need for follow up with the patient.       Medication List      Started    hydrALAZINE 10 MG tablet  Commonly known as:  APRESOLINE  10 mg, Oral, 3 TIMES DAILY            Final diagnoses:   Benign essential hypertension   Pain of right thumb     IStephan, am serving as a trained medical scribe to document services personally performed by  Job Cho DO, based on the provider's statements to me.      IJob DO, was physically present and have reviewed and verified the accuracy of this note documented by Stephan Reilly.      4/21/2019   [facility], [place], EMERGENCY DEPARTMENT     Job Cho,   04/22/19 0117

## 2019-04-22 NOTE — ED TRIAGE NOTES
Pt. Reports right sided temporal head pain, and right sided neck and shoulder pain, X2 days. No known injury. Pt. Report this has happened to her before, several times, with unknown cause. Pt. Has tried Tylenol and Advil with no relief. Also right thumb pain, unable to bend. Pt. Reports thumb has been painful since January and thinks it may be gout. Denies nausea, vision change, lightheadedness. Rates pain 7-8/10. /130

## 2019-04-24 ENCOUNTER — OFFICE VISIT (OUTPATIENT)
Dept: ORTHOPEDICS | Facility: CLINIC | Age: 39
End: 2019-04-24
Attending: EMERGENCY MEDICINE
Payer: COMMERCIAL

## 2019-04-24 ENCOUNTER — THERAPY VISIT (OUTPATIENT)
Dept: OCCUPATIONAL THERAPY | Facility: CLINIC | Age: 39
End: 2019-04-24
Payer: COMMERCIAL

## 2019-04-24 VITALS
DIASTOLIC BLOOD PRESSURE: 108 MMHG | HEIGHT: 65 IN | HEART RATE: 93 BPM | SYSTOLIC BLOOD PRESSURE: 153 MMHG | WEIGHT: 220 LBS | BODY MASS INDEX: 36.65 KG/M2

## 2019-04-24 DIAGNOSIS — M65.311 TRIGGER THUMB OF RIGHT HAND: ICD-10-CM

## 2019-04-24 DIAGNOSIS — M25.9 SHOULDER JOINT DYSFUNCTION: ICD-10-CM

## 2019-04-24 DIAGNOSIS — M65.311 TRIGGER THUMB OF RIGHT HAND: Primary | ICD-10-CM

## 2019-04-24 DIAGNOSIS — M79.644 THUMB PAIN, RIGHT: ICD-10-CM

## 2019-04-24 PROCEDURE — 97140 MANUAL THERAPY 1/> REGIONS: CPT | Mod: GO | Performed by: OCCUPATIONAL THERAPIST

## 2019-04-24 PROCEDURE — 97165 OT EVAL LOW COMPLEX 30 MIN: CPT | Mod: GO | Performed by: OCCUPATIONAL THERAPIST

## 2019-04-24 PROCEDURE — 97110 THERAPEUTIC EXERCISES: CPT | Mod: GO | Performed by: OCCUPATIONAL THERAPIST

## 2019-04-24 ASSESSMENT — MIFFLIN-ST. JEOR: SCORE: 1668.79
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
## 2019-04-24 NOTE — PROGRESS NOTES
Hand Therapy Initial Evaluation    Current Date:  4/24/2019    Diagnosis: Trigger thumb   DOI: January 2019   MD order date: 4/24/19    Subjective:  Elina Hood is a 38 year old R hand dominant female.    Patient reports symptoms of pain, stiffness/loss of motion and weakness/loss of strength of the right thumb which occurred due to Pt has noted episodes of stiffness and swelling through her fingers in the past, but the pain in the thumb began in January and has not gone away. The pain became worse after bowling this past weekend. Since onset symptoms are Unchanged  Special tests:  x-ray.  Previous treatment: aspirin.    General health as reported by patient is fair.  Pertinent medical history includes:Depression, High Blood Pressure  Medical allergies:none.  Surgical history: other.  Medication history: Anti-inflammatory, High Blood Pressure, allergy.    Occupational Profile Information:  Current occupation is , also warehouse work moving boxes  Currently working in normal job without restrictions  Job Tasks: Driving, Lifting, Carrying, Operating a Machine, Assembly, Prolonged Sitting, Prolonged Standing  Prior functional level:  no limitations  Barriers include:none  Mobility: No difficulty  Transportation: drives  Leisure activities/hobbies: Cooking    Functional Outcome Measure:  Upper Extremity Functional Index Score:  SCORE:   Column Totals: /80: 23   (A lower score indicates greater disability.)    Objective:  Pain Level (Scale 0-10):   4/24/2019   At Rest 5/10   With Use 7-10/10     Pain Description:  Date 4/24/2019   Location thumb around IP joint, and A1   Pain Quality Sharp and Throbbing   Frequency constant     Pain is worst daytime or nighttime, wakes every night   Exacerbated by Trying to  things, writing, getting dressed, brushing teeth   Relieved by aspirin   Progression Unchanged     ROM:  4/24/19: Pt able to demonstrate increased flexion IP joint with blocking.  Pain  Report:  - none    + mild    ++ moderate    +++ severe   Thumb 4/24/2019 4/24/2019   AROM(PROM) R L   MCP /64 /70   IP /5 /63   ARCE       Stage of Stenosing Tenosynovitis (SST):   4/24/2019   Triggering of R thumb Stage 4 (not observed in clinic, but per pt report)   Stage 1:  Normal  Stage 2:  Uneven motion of tendon  Stage 3:  Triggering, clicking, catching  Stage 4:  Locking in extension or flexion; unlocked by active motion  Stage 5:  Locking in extension or flexion; unlocked by passive motion  Stage 6:  Finger locked in extension or flexion    Palpation:   4/24/2019   A1 pulley R thumb Pain 8/10 with palpation. Moveable nodule noted     Sensation:  WNL throughout all nerve distributions; per patient report    Strength:   Deferred d/t pain and triggering.    Assessment:  Patient presents with symptoms consistent with diagnosis of R trigger thumb,  with conservative intervention.     Patient's limitations or Problem List includes:  Pain, Decreased ROM/motion, Weakness, Decreased , Decreased pinch, Decreased coordination and Decreased dexterity of the right thumb which interferes with the patient's ability to perform Self Care Tasks (dressing, eating, bathing, hygiene/toileting), Work Tasks, Sleep Patterns, Recreational Activities, Household Chores and Driving  as compared to previous level of function.    Rehab Potential:  Excellent - Return to full activity, no limitations    Patient will benefit from skilled Occupational Therapy to increase ROM,  strength and pinch strength and decrease pain to return to previous activity level and resume normal daily tasks and to reach their rehab potential.    Barriers to Learning:  No barrier    Communication Issues:  Patient appears to be able to clearly communicate and understand verbal and written communication and follow directions correctly.    Chart Review: Chart Review, Brief history including review of medical and/or therapy records relating to the presenting  problem and Simple history review with patient    Identified Performance Deficits: bathing/showering, dressing, hygiene and grooming, driving and community mobility, health management and maintenance, home establishment and management, meal preparation and cleanup, shopping, sleep, work and leisure activities    Assessment of Occupational Performance:  5 or more Performance Deficits    Clinical Decision Making (Complexity): Low complexity    Treatment Explanation:  The following has been discussed with the patient:  RX ordered/plan of care  Anticipated outcomes  Possible risks and side effects    Plan:  Frequency:  1 X week, once daily  Duration:  for 8 weeks    Treatment Plan:    Modalities:  US  Therapeutic Exercise:  AROM and PROM  Manual Techniques:  Friction massage  Orthotic Fabrication:  Static orthosis    Discharge Plan:  Achieve all LTG.  Independent in home treatment program.  Reach maximal therapeutic benefit.    Home Exercise Program:  Warmth for stiffness  Ice to A1 pulley/palm for inflammation  Decongestive and TFM to palm and A1 pulley  PROM finger flexion  Oval 8 or ring orthosis day as needed to prevent triggering    Next Visit:  Massage  US  PROM
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
## 2019-05-01 ENCOUNTER — THERAPY VISIT (OUTPATIENT)
Dept: OCCUPATIONAL THERAPY | Facility: CLINIC | Age: 39
End: 2019-05-01
Payer: COMMERCIAL

## 2019-05-01 DIAGNOSIS — M79.644 THUMB PAIN, RIGHT: ICD-10-CM

## 2019-05-01 PROCEDURE — 97760 ORTHOTIC MGMT&TRAING 1ST ENC: CPT | Mod: GO | Performed by: OCCUPATIONAL THERAPIST

## 2019-05-01 PROCEDURE — 97035 APP MDLTY 1+ULTRASOUND EA 15: CPT | Mod: GO | Performed by: OCCUPATIONAL THERAPIST

## 2019-05-01 PROCEDURE — 97140 MANUAL THERAPY 1/> REGIONS: CPT | Mod: GO | Performed by: OCCUPATIONAL THERAPIST

## 2019-05-01 NOTE — PROGRESS NOTES
Objective Note 5/1/19    Pain Level (Scale 0-10):   4/24/2019 5/1/19   At Rest 5/10    With Use 7-10/10 7/10       ROM:  4/24/19: Pt able to demonstrate increased flexion IP joint with blocking.  Pain Report:  - none    + mild    ++ moderate    +++ severe   Thumb 4/24/2019 4/24/2019 5/1/19   AROM(PROM) R L R   MCP /64 /70    IP /5 /63    IP blocked (MP straight)   /12     Stage of Stenosing Tenosynovitis (SST):   4/24/2019   Triggering of R thumb Stage 4 (not observed in clinic, but per pt report)   Stage 1:  Normal  Stage 2:  Uneven motion of tendon  Stage 3:  Triggering, clicking, catching  Stage 4:  Locking in extension or flexion; unlocked by active motion  Stage 5:  Locking in extension or flexion; unlocked by passive motion  Stage 6:  Finger locked in extension or flexion    Palpation:   4/24/2019   A1 pulley R thumb Pain 8/10 with palpation. Moveable nodule noted     Please refer to the daily flowsheet for treatment today, total treatment time and time spent performing 1:1 timed codes.

## 2019-05-31 ENCOUNTER — TRANSFERRED RECORDS (OUTPATIENT)
Dept: HEALTH INFORMATION MANAGEMENT | Facility: CLINIC | Age: 39
End: 2019-05-31

## 2019-05-31 ENCOUNTER — MEDICAL CORRESPONDENCE (OUTPATIENT)
Dept: HEALTH INFORMATION MANAGEMENT | Facility: CLINIC | Age: 39
End: 2019-05-31

## 2019-05-31 NOTE — ED AVS SNAPSHOT
[facility], [place], Emergency Department    [address] [place] [id number]    Phone:  [phone]                                       Elina Hood   MRN: [id number]    Department:  [facility], Emergency Department   Date of Visit:  7/7/2018           After Visit Summary Signature Page     I have received my discharge instructions, and my questions have been answered. I have discussed any challenges I see with this plan with the nurse or doctor.    ..........................................................................................................................................  Patient/Patient Representative Signature      ..........................................................................................................................................  Patient Representative Print Name and Relationship to Patient    ..................................................               ................................................  Date                                            Time    ..........................................................................................................................................  Reviewed by Signature/Title    ...................................................              ..............................................  Date                                                            Time           former smoker. no ETOH use

## 2019-06-03 ENCOUNTER — PRE VISIT (OUTPATIENT)
Dept: ORTHOPEDICS | Facility: CLINIC | Age: 39
End: 2019-06-03

## 2019-06-14 ENCOUNTER — OFFICE VISIT (OUTPATIENT)
Dept: ORTHOPEDICS | Facility: CLINIC | Age: 39
End: 2019-06-14
Payer: COMMERCIAL

## 2019-06-14 VITALS — DIASTOLIC BLOOD PRESSURE: 117 MMHG | HEART RATE: 82 BPM | RESPIRATION RATE: 16 BRPM | SYSTOLIC BLOOD PRESSURE: 173 MMHG

## 2019-06-14 DIAGNOSIS — M65.311 TRIGGER THUMB OF RIGHT HAND: Primary | ICD-10-CM

## 2019-06-14 RX ORDER — TRIAMCINOLONE ACETONIDE 40 MG/ML
20 INJECTION, SUSPENSION INTRA-ARTICULAR; INTRAMUSCULAR
Status: SHIPPED | OUTPATIENT
Start: 2019-06-14

## 2019-06-14 RX ORDER — LIDOCAINE HYDROCHLORIDE 10 MG/ML
0.5 INJECTION, SOLUTION INFILTRATION; PERINEURAL
Status: SHIPPED | OUTPATIENT
Start: 2019-06-14

## 2019-06-14 RX ADMIN — TRIAMCINOLONE ACETONIDE 20 MG: 40 INJECTION, SUSPENSION INTRA-ARTICULAR; INTRAMUSCULAR at 14:33

## 2019-06-14 RX ADMIN — LIDOCAINE HYDROCHLORIDE 0.5 ML: 10 INJECTION, SOLUTION INFILTRATION; PERINEURAL at 14:33

## 2019-06-14 NOTE — PROGRESS NOTES
Subjective:   Elina Hood is a 38 year old female who is here following up on right thumb pain/trigger thumb.  Here for an injection.    Date of injury: NA  Date last seen: 6/3/2019  Following Therapeutic Plan: Yes OT  Pain: Unchanged  Function: Unchanged  Interval History:  Seen by hand therapy    PAST MEDICAL, SOCIAL, SURGICAL AND FAMILY HISTORY: She  has a past medical history of Allergic state, GERD (gastroesophageal reflux disease), and Hypertension.  She  has no past surgical history on file.  Her family history is not on file.  She reports that she has never smoked. She has never used smokeless tobacco. She reports that she drinks alcohol. She reports that she does not use drugs.    ALLERGIES: She is allergic to nuts; peanut oil; and peanut butter flavor.    CURRENT MEDICATIONS: She has a current medication list which includes the following prescription(s): albuterol, aspirin, cetirizine, ibuprofen, losartan-hydrochlorothiazide, omeprazole, carvedilol, and hydralazine.     REVIEW OF SYSTEMS: 10 point review of systems is negative except as noted above.     Exam:   LMP  (LMP Unknown)            CONSTITUTIONAL: obese, alert, no distress and cooperative  HEAD: Normocephalic. No masses, lesions, tenderness or abnormalities  SKIN: no suspicious lesions or rashes  GAIT: normal  NEUROLOGIC: Non-focal, Normal muscle tone and strength, reflexes normal, sensation grossly normal.  PSYCHIATRIC: affect normal/bright and mentation appears normal.    MUSCULOSKELETAL: right thumb triggers  Inspection:Thumb:  slight swelling  Tender: Thumb:   IP joint, triggering  Non-tender: All Normal  Range of Motion Thumb:   flexion IP   painful, extension IP   painful  Strength: decreased strength  Special tests: none             Assessment/Plan:   Pt is a 39 yo AA female with PMhx of obesity presenting with right thumb pain  1. Right trigger thumb-  CSI today  Calm down area, limit lifting at [employer]  Given work letter  Ice,  brace    RTC 4 weeks    X-RAY INTERPRETATION:   none    Small Joint Injection/Arthrocentesis  Date/Time: 6/14/2019 2:33 PM  Performed by: Kerrie Pretty MD  Authorized by: Kerrie Pretty MD   Indications:  Pain  Needle Size:  25 G  Guidance: landmark     Approach:  Volar  Location:  Thumb   Location comment:  Right thumb A1 Pulley                       Medications:  0.5 mL lidocaine 1 %; 20 mg triamcinolone 40 MG/ML          Procedure discussed: discussed risks, benefits, and alternatives    Consent Given by:  Patient  Timeout: timeout called immediately prior to procedure    Prep: patient was prepped and draped in usual sterile fashion       Scribed by Bran Comer ATC for  on 6/14/19 at 2:35PM, based on providers statements to me.          I agree with the above injection documentation.  TINO Pretty MD

## 2019-06-14 NOTE — LETTER
6/14/2019      RE: Elina Hood  [address]  [facility] [address]        Subjective:   Elina Hood is a 38 year old female who is here following up on right thumb pain/trigger thumb.  Here for an injection.    Date of injury: NA  Date last seen: 6/3/2019  Following Therapeutic Plan: Yes OT  Pain: Unchanged  Function: Unchanged  Interval History:  Seen by hand therapy    PAST MEDICAL, SOCIAL, SURGICAL AND FAMILY HISTORY: She  has a past medical history of Allergic state, GERD (gastroesophageal reflux disease), and Hypertension.  She  has no past surgical history on file.  Her family history is not on file.  She reports that she has never smoked. She has never used smokeless tobacco. She reports that she drinks alcohol. She reports that she does not use drugs.    ALLERGIES: She is allergic to nuts; peanut oil; and peanut butter flavor.    CURRENT MEDICATIONS: She has a current medication list which includes the following prescription(s): albuterol, aspirin, cetirizine, ibuprofen, losartan-hydrochlorothiazide, omeprazole, carvedilol, and hydralazine.     REVIEW OF SYSTEMS: 10 point review of systems is negative except as noted above.     Exam:   LMP  (LMP Unknown)            CONSTITUTIONAL: obese, alert, no distress and cooperative  HEAD: Normocephalic. No masses, lesions, tenderness or abnormalities  SKIN: no suspicious lesions or rashes  GAIT: normal  NEUROLOGIC: Non-focal, Normal muscle tone and strength, reflexes normal, sensation grossly normal.  PSYCHIATRIC: affect normal/bright and mentation appears normal.    MUSCULOSKELETAL: right thumb triggers  Inspection:Thumb:  slight swelling  Tender: Thumb:   IP joint, triggering  Non-tender: All Normal  Range of Motion Thumb:   flexion IP   painful, extension IP   painful  Strength: decreased strength  Special tests: none             Assessment/Plan:   Pt is a 37 yo AA female with PMhx of obesity presenting with right thumb pain  1. Right trigger  thumb-  CSI today  Calm down area, limit lifting at [employer]  Given work letter  Ice, brace    RTC 4 weeks    X-RAY INTERPRETATION:   none    Small Joint Injection/Arthrocentesis  Date/Time: 6/14/2019 2:33 PM  Performed by: Kerrie Pretty MD  Authorized by: Kerrie Pretty MD   Indications:  Pain  Needle Size:  25 G  Guidance: landmark     Approach:  Volar  Location:  Thumb   Location comment:  Right thumb A1 Pulley                       Medications:  0.5 mL lidocaine 1 %; 20 mg triamcinolone 40 MG/ML          Procedure discussed: discussed risks, benefits, and alternatives    Consent Given by:  Patient  Timeout: timeout called immediately prior to procedure    Prep: patient was prepped and draped in usual sterile fashion       Scribed by Bran Comer ATC for  on 6/14/19 at 2:35PM, based on providers statements to me.          I agree with the above injection documentation.  MD Kerrie June MD

## 2019-06-14 NOTE — NURSING NOTE
[address] [address]  Dept: [phone]  ______________________________________________________________________________    Patient: Elina Hood   : 1980   MRN: [id number]   2019    INVASIVE PROCEDURE SAFETY CHECKLIST    Date: 19   Procedure:Right trigger thumb kenalog injection  Patient Name: Elina Hood  MRN: [id number]  YOB: 1980    Action: Complete sections as appropriate. Any discrepancy results in a HARD COPY until resolved.     PRE PROCEDURE:  Patient ID verified with 2 identifiers (name and  or MRN): Yes  Procedure and site verified with patient/designee (when able): Yes  Accurate consent documentation in medical record: Yes  H&P (or appropriate assessment) documented in medical record: Yes  H&P must be up to 20 days prior to procedure and updates within 24 hours of procedure as applicable: NA  Relevant diagnostic and radiology test results appropriately labeled and displayed as applicable: Yes  Procedure site(s) marked with provider initials: NA    TIMEOUT:  Time-Out performed immediately prior to starting procedure, including verbal and active participation of all team members addressing the following:Yes  * Correct patient identify  * Confirmed that the correct side and site are marked  * An accurate procedure consent form  * Agreement on the procedure to be done  * Correct patient position  * Relevant images and results are properly labeled and appropriately displayed  * The need to administer antibiotics or fluids for irrigation purposes during the procedure as applicable   * Safety precautions based on patient history or medication use    DURING PROCEDURE: Verification of correct person, site, and procedures any time the responsibility for care of the patient is transferred to another member of the care team.       Prior to injection, verified patient identity using patient's name and  date of birth.  Due to injection administration, patient instructed to remain in clinic for 15 minutes  afterwards, and to report any adverse reaction to me immediately.    Tendon sheath injection was performed.     Drug Amount Wasted:  Yes: 0.5 and 29.5 mg/ml kenalog and lidocaine   Vial/Syringe: Single dose vial  Expiration Date:  [place]/21 ; lidocaine:       Bran Comer ATC  2019

## 2019-06-14 NOTE — LETTER
June 14, 2019    RE:Elina Hood  [address]  [facility] [address]    1980            Dear Ms. Hood      To Whom It May Concern:    Elina Hood is under my professional care for Trigger thumb of right hand.   She has received a steroid injection today and is restricted from heavy lifting for 1 week and then she'll return to hand therapy.        Sincerely,      Kerrie Pretty MD

## 2019-07-11 PROBLEM — M79.644 THUMB PAIN, RIGHT: Status: RESOLVED | Noted: 2019-04-24 | Resolved: 2019-07-11

## 2019-08-28 ENCOUNTER — OFFICE VISIT (OUTPATIENT)
Dept: ORTHOPEDICS | Facility: CLINIC | Age: 39
End: 2019-08-28
Payer: COMMERCIAL

## 2019-08-28 VITALS — BODY MASS INDEX: 35.99 KG/M2 | WEIGHT: 216 LBS | RESPIRATION RATE: 16 BRPM | HEIGHT: 65 IN

## 2019-08-28 DIAGNOSIS — M65.312 TRIGGER FINGER OF LEFT THUMB: Primary | ICD-10-CM

## 2019-08-28 DIAGNOSIS — M65.311 TRIGGER FINGER OF RIGHT THUMB: ICD-10-CM

## 2019-08-28 ASSESSMENT — MIFFLIN-ST. JEOR: SCORE: 1650.65

## 2019-08-28 NOTE — PROGRESS NOTES
" Subjective:   Elina Hood is a 38 year old female who is here following up on trigger finger.She notes \"locking, snapping.\"  Been working at [employer], but right thumb started with [employer] delivery  Left hand is popping, hurts.  Icing and then can move the left thumb.  Working the right thumb, post-injection.  Doing same exercises at home, icing, exercises, but not doing much of anything    Date of injury: NA  Date last seen: 8/28/2019  Following Therapeutic Plan: Yes   Pain: Unchanged  Function: Unchanged  Interval History:  Wants to remove this, can't deal with these nodules and go to work    PAST MEDICAL, SOCIAL, SURGICAL AND FAMILY HISTORY: She  has a past medical history of Allergic state, GERD (gastroesophageal reflux disease), and Hypertension.  She  has no past surgical history on file.  Her family history is not on file.  She reports that she has never smoked. She has never used smokeless tobacco. She reports that she drinks alcohol. She reports that she does not use drugs.    ALLERGIES: She is allergic to nuts; peanut oil; and peanut butter flavor.    CURRENT MEDICATIONS: She has a current medication list which includes the following prescription(s): albuterol, aspirin, cetirizine, ibuprofen, losartan-hydrochlorothiazide, omeprazole, carvedilol, and hydralazine, and the following Facility-Administered Medications: lidocaine and triamcinolone.     REVIEW OF SYSTEMS: 10 point review of systems is negative except as noted above.     Exam:   Resp 16   Ht 1.651 m (5' 5\")   Wt 98 kg (216 lb)   LMP  (LMP Unknown)   BMI 35.94 kg/m             CONSTITUTIONAL: alert, mild distress, cooperative and obese  HEAD: Normocephalic. No masses, lesions, tenderness or abnormalities  SKIN: no suspicious lesions or rashes  GAIT: broad based and obese pattern  NEUROLOGIC: Non-focal, Normal muscle tone and strength, reflexes normal, sensation grossly normal.  PSYCHIATRIC: affect normal/bright and mentation appears " normal.    MUSCULOSKELETAL: bilateral trigger thumbs  Inspection:Thumb:  slight swelling  Tender: Thumb:   triggering  Non-tender: Thumb:   CMC, no thenar eminence wasting  Range of Motion Thumb:   opposition   full, flexion MCP   decreased, extension MCP   full, flexion IP   full, extension IP   full  Strength: full strength  Special tests: radial n, median n, ulnar n intact             Assessment/Plan:   Pt is a 39 yo AA female with PMhx of non-smoker, denies DM presenting with bilateral trigger thumb  1. Right thumb triggers- less often after one injection, went to hand therapy only a couple times, does have bracing from hand therapy to avoid triggering  2. Left thumb triggering- not as severe as right  Pt has exercises from right thumb  Discussed with hand therapy and only seen pt a couple times  Pt wants this issue to just go away.  Discussed injections vs seeing hand surgery  Pt opted for hand surgery referral    RTC prn  X-RAY INTERPRETATION:   xr right thumb  Impression:  1.  No acute osseous pathology.  2.  Relatively dense, sclerotic appearance of the sesamoid bone of the  interphalangeal joint of the first digit. Could represent normal  variant versus remote avulsion fracture. Correlate clinically.

## 2019-08-28 NOTE — LETTER
"  8/28/2019      RE: Elina Hood  [address]  [facility] [address]        Subjective:   Elina Hood is a 38 year old female who is here following up on trigger finger.She notes \"locking, snapping.\"  Been working at [employer], but right thumb started with [employer]  Left hand is popping, hurts.  Icing and then can move the left thumb.  Working the right thumb, post-injection.  Doing same exercises at home, icing, exercises, but not doing much of anything    Date of injury: NA  Date last seen: 8/28/2019  Following Therapeutic Plan: Yes   Pain: Unchanged  Function: Unchanged  Interval History:  Wants to remove this, can't deal with these nodules and go to work    PAST MEDICAL, SOCIAL, SURGICAL AND FAMILY HISTORY: She  has a past medical history of Allergic state, GERD (gastroesophageal reflux disease), and Hypertension.  She  has no past surgical history on file.  Her family history is not on file.  She reports that she has never smoked. She has never used smokeless tobacco. She reports that she drinks alcohol. She reports that she does not use drugs.    ALLERGIES: She is allergic to nuts; peanut oil; and peanut butter flavor.    CURRENT MEDICATIONS: She has a current medication list which includes the following prescription(s): albuterol, aspirin, cetirizine, ibuprofen, losartan-hydrochlorothiazide, omeprazole, carvedilol, and hydralazine, and the following Facility-Administered Medications: lidocaine and triamcinolone.     REVIEW OF SYSTEMS: 10 point review of systems is negative except as noted above.     Exam:   Resp 16   Ht 1.651 m (5' 5\")   Wt 98 kg (216 lb)   LMP  (LMP Unknown)   BMI 35.94 kg/m              CONSTITUTIONAL: alert, mild distress, cooperative and obese  HEAD: Normocephalic. No masses, lesions, tenderness or abnormalities  SKIN: no suspicious lesions or rashes  GAIT: broad based and obese pattern  NEUROLOGIC: Non-focal, Normal muscle tone and strength, reflexes " normal, sensation grossly normal.  PSYCHIATRIC: affect normal/bright and mentation appears normal.    MUSCULOSKELETAL: bilateral trigger thumbs  Inspection:Thumb:  slight swelling  Tender: Thumb:   triggering  Non-tender: Thumb:   CMC, no thenar eminence wasting  Range of Motion Thumb:   opposition   full, flexion MCP   decreased, extension MCP   full, flexion IP   full, extension IP   full  Strength: full strength  Special tests: radial n, median n, ulnar n intact             Assessment/Plan:   Pt is a 39 yo AA female with PMhx of non-smoker, denies DM presenting with bilateral trigger thumb  1. Right thumb triggers- less often after one injection, went to hand therapy only a couple times, does have bracing from hand therapy to avoid triggering  2. Left thumb triggering- not as severe as right  Pt has exercises from right thumb  Discussed with hand therapy and only seen pt a couple times  Pt wants this issue to just go away.  Discussed injections vs seeing hand surgery  Pt opted for hand surgery referral    RTC prn  X-RAY INTERPRETATION:   xr right thumb  Impression:  1.  No acute osseous pathology.  2.  Relatively dense, sclerotic appearance of the sesamoid bone of the  interphalangeal joint of the first digit. Could represent normal  variant versus remote avulsion fracture. Correlate clinically.     Kerrie Pretty MD

## 2019-08-29 NOTE — TELEPHONE ENCOUNTER
RECORDS RECEIVED FROM: INTERNAL   DATE RECEIVED: 8/28/19   NOTES STATUS DETAILS   OFFICE NOTE from referring provider Internal 8/28/19*6/14/19*4/24/19*   OFFICE NOTE from other specialist N/A    DISCHARGE SUMMARY from hospital N/A    DISCHARGE REPORT from the ER Internal 4/21/19*   OPERATIVE REPORT N/A    MEDICATION LIST Internal    IMPLANT RECORD/STICKER N/A    LABS     CBC/DIFF N/A    CULTURES N/A    INJECTIONS DONE IN RADIOLOGY N/A    MRI N/A    CT SCAN N/A    XRAYS (IMAGES & REPORTS) Internal 4/21/19*   TUMOR     PATHOLOGY  Slides & report N/A

## 2019-09-18 ENCOUNTER — PRE VISIT (OUTPATIENT)
Dept: ORTHOPEDICS | Facility: CLINIC | Age: 39
End: 2019-09-18

## 2019-09-18 ENCOUNTER — OFFICE VISIT (OUTPATIENT)
Dept: ORTHOPEDICS | Facility: CLINIC | Age: 39
End: 2019-09-18
Attending: FAMILY MEDICINE
Payer: COMMERCIAL

## 2019-09-18 DIAGNOSIS — M65.311 BILATERAL TRIGGER THUMB: Primary | ICD-10-CM

## 2019-09-18 DIAGNOSIS — M65.312 BILATERAL TRIGGER THUMB: Primary | ICD-10-CM

## 2019-09-18 ASSESSMENT — ENCOUNTER SYMPTOMS
VOMITING: 0
MYALGIAS: 1
SLEEP DISTURBANCES DUE TO BREATHING: 0
NIGHT SWEATS: 1
MUSCLE WEAKNESS: 1
SINUS CONGESTION: 1
ORTHOPNEA: 0
TROUBLE SWALLOWING: 1
JAUNDICE: 0
DOUBLE VISION: 0
EYE WATERING: 1
TASTE DISTURBANCE: 0
INSOMNIA: 1
NECK MASS: 0
DECREASED CONCENTRATION: 1
EYE PAIN: 0
HEARTBURN: 1
SYNCOPE: 0
HYPOTENSION: 0
CHILLS: 0
CONSTIPATION: 1
HOT FLASHES: 0
BLOOD IN STOOL: 0
JOINT SWELLING: 1
DECREASED APPETITE: 1
WEIGHT LOSS: 0
LEG PAIN: 1
POLYPHAGIA: 0
POLYDIPSIA: 0
BLOATING: 1
ALTERED TEMPERATURE REGULATION: 1
DEPRESSION: 1
ARTHRALGIAS: 1
DIARRHEA: 0
STIFFNESS: 1
WEIGHT GAIN: 1
FEVER: 0
HYPERTENSION: 1
LIGHT-HEADEDNESS: 1
BACK PAIN: 1
ABDOMINAL PAIN: 1
NECK PAIN: 1
MUSCLE CRAMPS: 1
NAUSEA: 1
PALPITATIONS: 1
EXERCISE INTOLERANCE: 0
HALLUCINATIONS: 0
NERVOUS/ANXIOUS: 1
INCREASED ENERGY: 1
SINUS PAIN: 0
SMELL DISTURBANCE: 0
PANIC: 1
RECTAL PAIN: 0
FATIGUE: 1

## 2019-09-18 NOTE — LETTER
September 18, 2019      RE: Elina Hood  [address]  [facility] [address]           To Whom it May Concern,        Elina Hood will not be able to work for 4 weeks following her hand surgery.          Sincerely,      Nelson Medrano MD

## 2019-09-18 NOTE — LETTER
2019       RE: Elnia Hood  [address]  [facility] [address]     Dear Colleague,    Thank you for referring your patient, Elina Hood, to the [facility] at [facility]. Please see a copy of my visit note below.    [facility]  Orthopedics  Nelson Medrano MD  2019     Name: Elina Hood  MRN: [id number]  Age: 38 year old  : 1980  Referring provider: Kerrie Acevedo*     Chief Complaint: Consult For (Bilateral trigger thumbs)    History of Present Illness:   Elina Hood is a 38 year old, female with  who presents today for evaluation of bilateral trigger thumb without any inciting injury or trauma. She localizes the pain at the volar aspect of her thumb MCP that is accompanied by triggering of bilateral thumbs.  Occasional locking. Her right is worse than her left.  She noted her right thumb has been bothering her for approximately 1 year.  Left thumb for approximately few months.  She has had her right thumb injected by Dr. Pretty in  of this year with no change in her symptoms other than mild improvement in pain.  Has occasional numbness and tingling in all fingers but is not related to the thumb popping. No further concerns at this time.     Review of Systems:   A 10-point review of systems was obtained and is negative except for as noted in the HPI.     Medications:     Current Outpatient Medications:      albuterol (ACCUNEB) 1.25 MG/3ML nebulizer solution, Take 1 vial by nebulization every 6 hours as needed for shortness of breath / dyspnea or wheezing, Disp: , Rfl:      aspirin 325 MG tablet, Take 325 mg by mouth daily, Disp: , Rfl:      carvedilol (COREG) 25 MG tablet, Take 1 tablet (25 mg) by mouth 2 times daily, Disp: 60 tablet, Rfl: 0     cetirizine (ZYRTEC) 10 MG tablet, Take 10 mg by mouth daily as needed for allergies, Disp: , Rfl:      hydrALAZINE (APRESOLINE) 10 MG tablet, Take 1  tablet (10 mg) by mouth 3 times daily (Patient not taking: Reported on 4/24/2019), Disp: 30 tablet, Rfl: 0     ibuprofen (ADVIL/MOTRIN) 200 MG tablet, Take 400 mg by mouth every 4 hours as needed for mild pain, Disp: , Rfl:      losartan-hydrochlorothiazide (HYZAAR) 100-12.5 MG per tablet, Take 1 tablet by mouth daily, Disp: , Rfl:      omeprazole (PRILOSEC) 20 MG CR capsule, Take 20 mg by mouth daily as needed (heartburn), Disp: , Rfl:     Current Facility-Administered Medications:      lidocaine 1 % injection 0.5 mL, 0.5 mL, , , Kerrie Pretty MD, 0.5 mL at 06/14/19 1433     triamcinolone (KENALOG-40) injection 20 mg, 20 mg, , , Kerrie Pretty MD, 20 mg at 06/14/19 1433    Allergies:  Nuts; Peanut oil; and Peanut butter flavor     Past Medical History:  Allergies  GERD  Hypertension    Past Surgical History:  R eye surgery   C section   Hysterectomy  Cyst removal     Social History:  She denies tobacco use and admits to rare alcohol use. Works at [employer].     Family History:  No family history on file.    Physical Examination:  General: Healthy appearing female. Affect appropriate. Normal gait. Alert and oriented to surroundings.   Bilateral Upper Extremity:   No swelling/wounds  Skin is intact  No deformities  Tenderness of bilateral thumb A1 pulleys  Palpable triggering of thumbs on exam  Able to flex and extend thumb Ip joints but with apprehension  Otherwise has full ROM of all digits  Other A1 pulleys nontender  No TTP 1st dorsal compartment and thumb CMC joint  [employer]     Imaging:   Radiographs of the right thumb 3  views (04/2019)  No obvious bony abnormality.    I have independently reviewed the above imaging studies; the results were discussed with the patient.     Assessment:   38 year old right-hand-dominant female with bilateral trigger thumbs, right greater than left.     Plan:   We had a lengthy discussion about the diagnosis of trigger thumb and possible  treatment options. We discussed three possible treatment options. The first would be to continue to observe the symptoms for any change or progression. The second would be to perform another corticosteroid injection. The third is to consider surgery, which would be a trigger release. She wishes to go ahead with bilateral trigger thumb release. She would like to do both at the same time. I discussed she may have sweling and soreness for quite some time after surgery and so I encourage her to consider doing only one side at a time. She says she prefers to do both and get both done.   Provided a work restriction note for 4 weeks after surgery- works at [employer].   I discussed the risks and benefits of surgery, including but not limited to: infection, bleeding, pain, scarring, damage to nerves, blood vessels, or other nearby structures, re-currence or persistence of trigger,  stiffness, need for further surgery, and wound healing issues.Informec consent was obatined.     Nelson Medrano MD    Scribe Disclosure:  I, Fco Lou, am serving as a scribe to document services personally performed by Nelson Medrano MD at this visit, based upon the provider's statements to me. All documentation has been reviewed by the aforementioned provider prior to being entered into the official medical record.

## 2019-09-18 NOTE — PROGRESS NOTES
[facility]  Orthopedics  Nelson Medrano MD  2019     Name: Elina Hood  MRN: [id number]  Age: 38 year old  : 1980  Referring provider: Kerrie Acevedo*     Chief Complaint: Consult For (Bilateral trigger thumbs)    History of Present Illness:   Elina Hood is a 38 year old, female with  who presents today for evaluation of bilateral trigger thumb without any inciting injury or trauma. She localizes the pain at the volar aspect of her thumb MCP that is accompanied by triggering of bilateral thumbs.  Occasional locking. Her right is worse than her left.  She noted her right thumb has been bothering her for approximately 1 year.  Left thumb for approximately few months.  She has had her right thumb injected by Dr. Pretty in  of this year with no change in her symptoms other than mild improvement in pain.  Has occasional numbness and tingling in all fingers but is not related to the thumb popping. No further concerns at this time.     Review of Systems:   A 10-point review of systems was obtained and is negative except for as noted in the HPI.     Medications:     Current Outpatient Medications:      albuterol (ACCUNEB) 1.25 MG/3ML nebulizer solution, Take 1 vial by nebulization every 6 hours as needed for shortness of breath / dyspnea or wheezing, Disp: , Rfl:      aspirin 325 MG tablet, Take 325 mg by mouth daily, Disp: , Rfl:      carvedilol (COREG) 25 MG tablet, Take 1 tablet (25 mg) by mouth 2 times daily, Disp: 60 tablet, Rfl: 0     cetirizine (ZYRTEC) 10 MG tablet, Take 10 mg by mouth daily as needed for allergies, Disp: , Rfl:      hydrALAZINE (APRESOLINE) 10 MG tablet, Take 1 tablet (10 mg) by mouth 3 times daily (Patient not taking: Reported on 2019), Disp: 30 tablet, Rfl: 0     ibuprofen (ADVIL/MOTRIN) 200 MG tablet, Take 400 mg by mouth every 4 hours as needed for mild pain, Disp: , Rfl:      losartan-hydrochlorothiazide (HYZAAR) 100-12.5 MG per tablet, Take 1  tablet by mouth daily, Disp: , Rfl:      omeprazole (PRILOSEC) 20 MG CR capsule, Take 20 mg by mouth daily as needed (heartburn), Disp: , Rfl:     Current Facility-Administered Medications:      lidocaine 1 % injection 0.5 mL, 0.5 mL, , , Kerrie Pretty MD, 0.5 mL at 06/14/19 1433     triamcinolone (KENALOG-40) injection 20 mg, 20 mg, , , Kerrei Pretty MD, 20 mg at 06/14/19 1433    Allergies:  Nuts; Peanut oil; and Peanut butter flavor     Past Medical History:  Allergies  GERD  Hypertension    Past Surgical History:  R eye surgery   C section   Hysterectomy  Cyst removal     Social History:  She denies tobacco use and admits to rare alcohol use. Works at [employer].     Family History:  No family history on file.    Physical Examination:  General: Healthy appearing female. Affect appropriate. Normal gait. Alert and oriented to surroundings.   Bilateral Upper Extremity:   No swelling/wounds  Skin is intact  No deformities  Tenderness of bilateral thumb A1 pulleys  Palpable triggering of thumbs on exam  Able to flex and extend thumb Ip joints but with apprehension  Otherwise has full ROM of all digits  Other A1 pulleys nontender  No TTP 1st dorsal compartment and thumb CMC joint  [employer]     Imaging:   Radiographs of the right thumb 3  views (04/2019)  No obvious bony abnormality.    I have independently reviewed the above imaging studies; the results were discussed with the patient.     Assessment:   38 year old right-hand-dominant female with bilateral trigger thumbs, right greater than left.     Plan:   We had a lengthy discussion about the diagnosis of trigger thumb and possible treatment options. We discussed three possible treatment options. The first would be to continue to observe the symptoms for any change or progression. The second would be to perform another corticosteroid injection. The third is to consider surgery, which would be a trigger release. She wishes to go  ahead with bilateral trigger thumb release. She would like to do both at the same time. I discussed she may have sweling and soreness for quite some time after surgery and so I encourage her to consider doing only one side at a time. She says she prefers to do both and get both done.   Provided a work restriction note for 4 weeks after surgery- works at [employer].   I discussed the risks and benefits of surgery, including but not limited to: infection, bleeding, pain, scarring, damage to nerves, blood vessels, or other nearby structures, re-currence or persistence of trigger,  stiffness, need for further surgery, and wound healing issues.Informec consent was obatined.     Nelson Medrano MD            Scribe Disclosure:  I, Fco Lou, am serving as a scribe to document services personally performed by Nelson Medrano MD at this visit, based upon the provider's statements to me. All documentation has been reviewed by the aforementioned provider prior to being entered into the official medical record.     Answers for HPI/ROS submitted by the patient on 9/18/2019   General Symptoms: Yes  Skin Symptoms: No  HENT Symptoms: Yes  EYE SYMPTOMS: Yes  HEART SYMPTOMS: Yes  LUNG SYMPTOMS: No  INTESTINAL SYMPTOMS: Yes  URINARY SYMPTOMS: No  GYNECOLOGIC SYMPTOMS: Yes  BREAST SYMPTOMS: No  SKELETAL SYMPTOMS: Yes  BLOOD SYMPTOMS: No  NERVOUS SYSTEM SYMPTOMS: No  MENTAL HEALTH SYMPTOMS: Yes  Fever: No  Loss of appetite: Yes  Weight loss: No  Weight gain: Yes  Fatigue: Yes  Night sweats: Yes  Chills: No  Increased stress: Yes  Excessive hunger: No  Excessive thirst: No  Feeling hot or cold when others believe the temperature is normal: Yes  Loss of height: Yes  Surgical site pain: Yes  Hallucinations: No  Change in or Loss of Energy: Yes  Confusion: No  Ear pain: No  Ear discharge: No  Hearing loss: No  Tinnitus: Yes  Nosebleeds: No  Congestion: Yes  Sinus pain: No  Trouble swallowing: Yes  Mouth sores: No  Tooth  pain: Yes  Gum tenderness: No  Bleeding gums: Yes  Change in taste: No  Change in sense of smell: No  Dry mouth: No  Hearing aid used: No  Neck lump: No  Eye pain: No  Vision loss: No  Dry eyes: No  Watery eyes: Yes  Eye bulging: No  Double vision: No  Flashing of lights: No  Spots: Yes  Chest pain or pressure: No  Fast or irregular heartbeat: Yes  Pain in legs with walking: Yes  Trouble breathing while lying down: No  Fingers or toes appear blue: No  High blood pressure: Yes  Low blood pressure: No  Fainting: No  Murmurs: No  Pacemaker: No  Edema or swelling: No  Wake up at night with shortness of breath: No  Light-headedness: Yes  Exercise intolerance: No  Heart burn or indigestion: Yes  Nausea: Yes  Vomiting: No  Abdominal pain: Yes  Bloating: Yes  Constipation: Yes  Diarrhea: No  Blood in stool: No  Black stools: Yes  Rectal or Anal pain: No  Yellowing of skin or eyes: No  Vomit with blood: No  Change in stools: No  Back pain: Yes  Muscle aches: Yes  Neck pain: Yes  Swollen joints: Yes  Joint pain: Yes  Bone pain: Yes  Muscle cramps: Yes  Muscle weakness: Yes  Joint stiffness: Yes  Bone fracture: No  Vaginal discharge: Yes  Hot flashes: No  Vaginal dryness: Yes  Painful intercourse: Yes  Difficulty with sexual arousal: Yes  Nervous or Anxious: Yes  Depression: Yes  Trouble sleeping: Yes  Trouble thinking or concentrating: Yes  Mood changes: Yes  Panic attacks: Yes

## 2019-09-18 NOTE — NURSING NOTE
Teaching Flowsheet   Relevant Diagnosis: Bilateral thumb trigger finger  Teaching Topic: Bilateral thumb trigger release    CSC under local anesthetic only with Dr Nelson Medrano.  Consent obtained for procedure  BMI 35.94  Work note provided, off work x 4 weeks post op    Person(s) involved in teaching:   Patient     Motivation Level:  Asks Questions: Yes  Eager to Learn: Yes  Cooperative: Yes  Receptive (willing/able to accept information): Yes  Any cultural factors/Oriental orthodox beliefs that may influence understanding or compliance? No       Patient demonstrates understanding of the following:  Reason for the appointment, diagnosis and treatment plan: Yes  Knowledge of proper use of medications and conditions for which they are ordered (with special attention to potential side effects or drug interactions): Yes  Which situations necessitate calling provider and whom to contact: Yes       Teaching Concerns Addressed:        Proper use and care of  (medical equip, care aids, etc.): Yes  Nutritional needs and diet plan: Yes  Pain management techniques: Yes  Wound Care: Yes  How and/when to access community resources: Yes     Instructional Materials Used/Given: Preoperative teaching packet and antibacterial soap     Time spent with patient: 10 minutes. Jackelyn Sandoval RN

## 2019-09-19 ENCOUNTER — TELEPHONE (OUTPATIENT)
Dept: ORTHOPEDICS | Facility: CLINIC | Age: 39
End: 2019-09-19

## 2019-09-19 NOTE — TELEPHONE ENCOUNTER
Patient is scheduled for surgery with Dr. Medrano      Spoke or left message with: Spoke with Elina    Date of Surgery: 10/3/19    Location: [facility]    Informed patient they will need an adult  Yes    H&P: N/A Local Only    Additional imaging/appointments: N/A    Surgery packet: Given in clinic    Additional comments: Patient will receive pre op phone call 1-2 days prior to surgery for arrival time

## 2019-09-25 ENCOUNTER — DOCUMENTATION ONLY (OUTPATIENT)
Dept: ORTHOPEDICS | Facility: CLINIC | Age: 39
End: 2019-09-25

## 2019-09-25 NOTE — PROGRESS NOTES
Completed Health Care Provider Certification and faxed to [facility] at [phone]. Submitted original to Medical Records for scanning and emailed copy to patient with encryption.

## 2019-09-30 RX ORDER — LIDOCAINE HYDROCHLORIDE 10 MG/ML
5 INJECTION, SOLUTION EPIDURAL; INFILTRATION; INTRACAUDAL; PERINEURAL ONCE
Status: CANCELLED | OUTPATIENT
Start: 2019-10-03

## 2019-09-30 RX ORDER — BUPIVACAINE HYDROCHLORIDE 5 MG/ML
5 INJECTION, SOLUTION EPIDURAL; INTRACAUDAL ONCE
Status: CANCELLED | OUTPATIENT
Start: 2019-10-03

## 2019-10-03 ENCOUNTER — HOSPITAL ENCOUNTER (OUTPATIENT)
Facility: AMBULATORY SURGERY CENTER | Age: 39
End: 2019-10-03
Attending: ORTHOPAEDIC SURGERY
Payer: COMMERCIAL

## 2019-10-03 VITALS
WEIGHT: 210 LBS | SYSTOLIC BLOOD PRESSURE: 166 MMHG | DIASTOLIC BLOOD PRESSURE: 106 MMHG | HEART RATE: 68 BPM | RESPIRATION RATE: 16 BRPM | HEIGHT: 65 IN | BODY MASS INDEX: 34.99 KG/M2 | OXYGEN SATURATION: 99 % | TEMPERATURE: 98.1 F

## 2019-10-03 DIAGNOSIS — R29.898 WEAKNESS OF RIGHT UPPER EXTREMITY: Primary | ICD-10-CM

## 2019-10-03 RX ORDER — LIDOCAINE HYDROCHLORIDE AND EPINEPHRINE 10; 10 MG/ML; UG/ML
10 INJECTION, SOLUTION INFILTRATION; PERINEURAL ONCE
Status: COMPLETED | OUTPATIENT
Start: 2019-10-03 | End: 2019-10-03

## 2019-10-03 RX ORDER — LIDOCAINE HYDROCHLORIDE AND EPINEPHRINE 10; 10 MG/ML; UG/ML
10 INJECTION, SOLUTION INFILTRATION; PERINEURAL ONCE
Status: DISCONTINUED | OUTPATIENT
Start: 2019-10-03 | End: 2019-10-04 | Stop reason: HOSPADM

## 2019-10-03 RX ORDER — HYDROCODONE BITARTRATE AND ACETAMINOPHEN 5; 325 MG/1; MG/1
1 TABLET ORAL EVERY 6 HOURS PRN
Qty: 10 TABLET | Refills: 0 | Status: SHIPPED | OUTPATIENT
Start: 2019-10-03

## 2019-10-03 ASSESSMENT — MIFFLIN-ST. JEOR: SCORE: 1623.43

## 2019-10-03 NOTE — DISCHARGE INSTRUCTIONS
[facility]  Home Care Following Your Procedure  Call a doctor if you have signs of infection (fever, growing tenderness at the surgery site, a large amount of drainage or bleeding, severe pain, foul-smelling drainage, redness, swelling).         Tylenol/Acetaminophen Consumption  To help encourage the safe use of acetaminophen, the makers of TYLENOL  have lowered the maximum daily dose for single-ingredient Extra Strength TYLENOL  (acetaminophen) products sold in the [place] from 8 pills per day (4,000 mg) to 6 pills per day (3,000 mg). The dosing interval has also changed from 2 pills every 4-6 hours to 2 pills every 6 hours.    If you feel your pain relief is insufficient, you may take Tylenol/Acetaminophen in addition to your narcotic pain medication.     Be careful not to exceed 3,000 mg of Tylenol/Acetaminophen in a 24 hour period from all sources.    If you are taking extra strength Tylenol/acetaminophen (500 mg), the maximum dose is 6 tablets in 24 hours.    If you are taking regular strength acetaminophen (325 mg), the maximum dose is 9 tablets in 24 hours.  Your doctor is:  Dr. Nelson Medrano, Orthopaedics: [phone]                                    Or dial [phone] and ask for the resident on call for:  Orthopaedics  For emergency care, call the:  [facility]: [phone] (TTY for hearing impaired: [phone])                Instructions for after your surgery    Remove your dressing after 3 days. At that point, can wash area with soap and water daily and then pat dry. Then re-wrap with gauze and coban. Be careful not to allow coban to get too tight. If the coban ever gets too tight (like its cutting off circulation), remove it and re-wrap it more loosely.      Keep your operative hand elevated as much as possible. This will help with pain and swelling.     Do not lift anything heavier than a coffee cup with your operative hand. You can use it for light  activities like eating and brushing your teeth.    Depending on your surgery, you will have a follow-up appointment scheduled with Dr. Medrano or an  or nurse. If you do not know when this appointment is, please call Dr. Medrano's office to find out.     Call Dr. Medrano's office if you experience any of the following:   Fevers, chills, increasing wound drainage, pain that is not controlled with the medications you have been prescribing, swelling that is not controlled with elevation, problems with your dressing, or any other questions or concerns.

## 2019-10-03 NOTE — OP NOTE
PREOPERATIVE DIAGNOSIS: bilateral trigger thumb      POSTOPERATIVE DIAGNOSIS:   bilateral trigger thumb      PROCEDURE PERFORMED:   bilateral trigger thumb release      ATTENDING PHYSICIAN:  Nelson Medrnao MD        ASSISTANT: None      ANESTHESIA:  Local anesthesia only consisting of 4 mL of 1% lidocaine with epinephrine 1:100,000 x 2       ESTIMATED BLOOD LOSS:  1 mL        TOURNIQUET TIME: 5 min for left, 7 min for right      IMPLANTS:  None.         SPECIMENS: None.      FINDINGS:  Thickened A1 pulley bilaterally with substantial overlying inflammation on the right and split thickness tearing of FPL      INDICATIONS: This patient is a  38 year old year old female who presented to clinic with a chief complaint of bilateral thumb catching and locking. Treatment options were discussed including continued observation, injection, and surgical A1 pulley release. The patient elected to proceed with surgery. Risks of surgery were discussed including but not limited to infection, bleeding, wound healing problems, damage to surrounding structures including nerves, blood vessels, and tendon, recurrence, stiffness, and persistent pain. The patient expressed understanding and wished to proceed with the above surgery. Informed consent was obtained.       PROCEDURE:   The patient was identified in the preoperative area. The surgical sites were marked. 4 mL of 1% lidocaine with 1:100,000 epinephrine was injected into the subcutaneous tissues at the surgical site bilaterally. The patient was brought back to the operating room and positioned with left operative extremity over the hand table. A forearm tourniquet was placed. The site was prepped and draped in the usual sterile fashion. A timeout was performed confirming patient, site of surgery, and procedure to be performed. The arm was exsanguinated and the tourniquet inflated. An incision was made overlying the proximal thumb crease. The radial digital nerve to the thumb was  identified and protected. Dissection was taken down to the A1 pulley. The A1 pulley exposed and then was released with a knife. Care was taken to divide it fully including its most proximal and distal extents without getting into the oblique pulley. Following the release, a right angle retractor was used to bring the flexor pollicis longus tendon up into the wound for inspection. It did not catch or lock as this was done. The tendon was released and the patient was asked to flex and extend the thumb which she was able to do without triggering or locking. The tourniquet was taken down and hemostasis achieved. The wound was irrigated copiously. Hemostasis was excellent. The wound was closed with 4-0 nylon horizontal mattress sutures. A sterile dressing was applied of Xeroform, 4 x 4's, fluffs, Kerlix, and Coban. Thumb perfusion was excellent with good capillary refill.     Attention was then turned to the right side which was then positioned over the hand table. A forearm tourniquet was placed. The site was prepped and draped in the usual sterile fashion. A timeout was performed confirming patient, site of surgery, and procedure to be performed. The arm was exsanguinated and the tourniquet inflated. An incision was made overlying the proximal thumb crease. The radial digital nerve to the thumb was identified and protected. Dissection was taken down to the A1 pulley. The A1 pulley exposed and then was released with a knife. Care was taken to divide it fully including its most proximal and distal extents without getting into the oblique pulley. Following the release, a right angle retractor was used to bring the flexor pollicis longus tendon up into the wound for inspection. It did not catch or lock as this was done. The tendon was released and the patient was asked to flex and extend the thumb which she was able to do without triggering or locking. The tourniquet was taken down and hemostasis achieved. The wound was  irrigated copiously. Hemostasis was excellent. The wound was closed with 4-0 nylon horizontal mattress sutures. A sterile dressing was applied of Xeroform, 4 x 4's, fluffs, Kerlix, and Coban. Thumb perfusion was excellent with good capillary refill.     The patient was brought to the recovery room in stable condition.  All sponge, needle and instrument counts were correct at the end of the case.       PLAN: The dressings  will remain clean, dry and intact for 5 days.  After that time the dressings may be removed, the incisions may get wet in the shower and Band-Aids may be used for coverage of the wound.  The patient should return to clinic for suture removal in 10-14 days. The patient should not lift anything heavier than the weight of a coffee cup before this time.

## 2019-10-05 ENCOUNTER — HEALTH MAINTENANCE LETTER (OUTPATIENT)
Age: 39
End: 2019-10-05

## 2019-10-17 ENCOUNTER — ALLIED HEALTH/NURSE VISIT (OUTPATIENT)
Dept: ORTHOPEDICS | Facility: CLINIC | Age: 39
End: 2019-10-17
Payer: COMMERCIAL

## 2019-10-17 DIAGNOSIS — M65.312 BILATERAL TRIGGER THUMB: Primary | ICD-10-CM

## 2019-10-17 DIAGNOSIS — M65.311 BILATERAL TRIGGER THUMB: Primary | ICD-10-CM

## 2019-10-17 NOTE — PROGRESS NOTES
Reason for visit:    Elina Hood came in to the clinic for a two week post op check.    Her surgery was done 10/3/19 by Dr Medrano.  She had bilateral trigger thumb release.     Assessment:    Elina came into the clinic with band aids covering her surgical wounds.     The Surgical wounds were exposed and found to be well-healed; so the sutures were removed.    Plan:     She was placed in band aids.  She was told to keep the area clean and dry, not to soak or submerge it in water, not to apply any lotions, ointments, or creams over the area, and not to lift anything greater than 5 lbs.      She has an appointment to see Dr. Medrano at that time Dr. Medrano will determine further restrictions.    She has our phone number and will call with questions or problems.

## 2019-10-21 ENCOUNTER — HOSPITAL ENCOUNTER (EMERGENCY)
Facility: CLINIC | Age: 39
Discharge: HOME | End: 2019-10-21
Attending: EMERGENCY MEDICINE | Admitting: EMERGENCY MEDICINE
Payer: COMMERCIAL

## 2019-10-21 VITALS
RESPIRATION RATE: 14 BRPM | BODY MASS INDEX: 36.27 KG/M2 | HEART RATE: 85 BPM | DIASTOLIC BLOOD PRESSURE: 106 MMHG | HEIGHT: 65 IN | OXYGEN SATURATION: 99 % | SYSTOLIC BLOOD PRESSURE: 167 MMHG | WEIGHT: 217.7 LBS | TEMPERATURE: 98.7 F

## 2019-10-21 DIAGNOSIS — M25.511 ACUTE PAIN OF RIGHT SHOULDER: ICD-10-CM

## 2019-10-21 LAB
ANION GAP SERPL CALCULATED.3IONS-SCNC: 6 MMOL/L (ref 3–14)
BUN SERPL-MCNC: 15 MG/DL (ref 7–30)
CALCIUM SERPL-MCNC: 9.5 MG/DL (ref 8.5–10.1)
CHLORIDE SERPL-SCNC: 104 MMOL/L (ref 94–109)
CO2 SERPL-SCNC: 28 MMOL/L (ref 20–32)
CREAT SERPL-MCNC: 0.8 MG/DL (ref 0.52–1.04)
GFR SERPL CREATININE-BSD FRML MDRD: >90 ML/MIN/{1.73_M2}
GLUCOSE SERPL-MCNC: 124 MG/DL (ref 70–99)
POTASSIUM SERPL-SCNC: 3.3 MMOL/L (ref 3.4–5.3)
SODIUM SERPL-SCNC: 138 MMOL/L (ref 133–144)

## 2019-10-21 PROCEDURE — 80048 BASIC METABOLIC PNL TOTAL CA: CPT | Performed by: EMERGENCY MEDICINE

## 2019-10-21 PROCEDURE — 99283 EMERGENCY DEPT VISIT LOW MDM: CPT | Performed by: EMERGENCY MEDICINE

## 2019-10-21 PROCEDURE — 25000132 ZZH RX MED GY IP 250 OP 250 PS 637: Performed by: EMERGENCY MEDICINE

## 2019-10-21 PROCEDURE — 99284 EMERGENCY DEPT VISIT MOD MDM: CPT | Mod: Z6 | Performed by: EMERGENCY MEDICINE

## 2019-10-21 RX ORDER — CYCLOBENZAPRINE HCL 10 MG
10 TABLET ORAL 3 TIMES DAILY PRN
Qty: 20 TABLET | Refills: 0 | Status: SHIPPED | OUTPATIENT
Start: 2019-10-21 | End: 2019-10-27

## 2019-10-21 RX ORDER — CYCLOBENZAPRINE HCL 5 MG
10 TABLET ORAL ONCE
Status: COMPLETED | OUTPATIENT
Start: 2019-10-21 | End: 2019-10-21

## 2019-10-21 RX ADMIN — CYCLOBENZAPRINE HYDROCHLORIDE 10 MG: 5 TABLET, FILM COATED ORAL at 07:30

## 2019-10-21 ASSESSMENT — ENCOUNTER SYMPTOMS
SEIZURES: 0
PALPITATIONS: 0
CHILLS: 0
CHEST TIGHTNESS: 0
HEADACHES: 0
VOMITING: 0
FEVER: 0
WOUND: 0
SORE THROAT: 0
NUMBNESS: 0
NAUSEA: 0
DIARRHEA: 0
SHORTNESS OF BREATH: 0
PHOTOPHOBIA: 0
NECK STIFFNESS: 1
MYALGIAS: 1

## 2019-10-21 ASSESSMENT — MIFFLIN-ST. JEOR: SCORE: 1658.36

## 2019-10-21 NOTE — ED PROVIDER NOTES
"  History     Chief Complaint   Patient presents with     Neck Pain     HPI  Elina Hood is a 38 year old female who has past medical history of hypertension, GERD presenting with pain in her right shoulder.  Has been going on for 2 days.  She was not using this arm due to recent trigger finger surgery and that she started using it and start.  It hurts when she moves her head and she lifts her arm.  Denies car accidents, chiropractic manipulation, yoga, new activities.  Denies numbness, tingling or weakness in her arms, legs or hands.  The pain does not radiate from her neck.  She denies headache, visual changes, tearing or ripping pain in her neck.  Pain is more in her trapezius muscle and it hurts to move her head and the base of the neck.  She is not on blood thinners.    I have reviewed the Medications, Allergies, Past Medical and Surgical History, and Social History in the Epic system.    Review of Systems   Constitutional: Negative for chills and fever.   HENT: Negative for sore throat and tinnitus.    Eyes: Negative for photophobia and visual disturbance.   Respiratory: Negative for chest tightness and shortness of breath.    Cardiovascular: Negative for chest pain and palpitations.   Gastrointestinal: Negative for diarrhea, nausea and vomiting.   Musculoskeletal: Positive for myalgias and neck stiffness.   Skin: Negative for rash and wound.   Neurological: Negative for seizures, syncope, numbness and headaches.   All other systems reviewed and are negative.      Physical Exam   BP: (!) 188/128  Pulse: 89  Temp: 98.7  F (37.1  C)  Resp: 14  Height: 165.1 cm (5' 5\")  Weight: 98.7 kg (217 lb 11.2 oz)  SpO2: 100 %      Physical Exam  Physical Exam   Constitutional: oriented to person, place, and time. appears well-developed and well-nourished.   HENT:   Head: Normocephalic and atraumatic.   Neck: Normal range of motion.   Pulmonary/Chest: Effort normal. No respiratory distress.   Cardiac: No murmurs, rubs, " gallops. RRR.  Abdominal: Abdomen soft, nontender, nondistended. No rebound tenderness.  MSK: Right trapezius tender to palpation with palpable muscle spasm.  No masses or ecchymosis.  No signs of trauma.  Right shoulder range of motion intact actively and passively with some pain in the trapezius.  No tenderness palpation over the C-spine or paraspinous muscles of the neck.  Radial pulses symmetric in the upper extremities.   strength symmetric in the upper extremities.  Sensation grossly intact in the upper extremities.  No tenderness palpation over the clavicle, AC joint.  Neurological: alert and oriented to person, place, and time. Pupils equal reactive to light.  Gait is intact.  Finger-nose-finger intact.  Skin: Skin is warm and dry.   Psychiatric:  normal mood and affect.  behavior is normal. Thought content normal.     ED Course        Procedures            Labs Ordered and Resulted from Time of ED Arrival Up to the Time of Departure from the ED   BASIC METABOLIC PANEL - Abnormal; Notable for the following components:       Result Value    Potassium 3.3 (*)     All other components within normal limits            Assessments & Plan (with Medical Decision Making)   MDM  Patient presenting with pain in the right trapezius.  This does favor musculoskeletal as there is palpable spasm in addition to pain with range of motion of the shoulder and neck.  She is hypertensive, very unlikely carotid dissection due to lack of ripping or tearing neck pain.  Pain is reproducible and more in the trapezius muscle.  There is no neurologic signs or symptoms of carotid disease or stroke.  Radial pulses symmetric and intact.  No evidence of compartment syndrome on examination.  I do not feel that neck imaging is necessary due to lack manipulation in addition to findings of muscular pain.  She will be given Flexeril.  The involved panel done to assess creatinine that she is hypertensive, she is on medications and says that  she has been taking it.  Recommend following up with her primary doctor to discuss her hypertension and then also if she is having ongoing shoulder pain.  Patient will return if symptoms are worsening    I have reviewed the nursing notes.    I have reviewed the findings, diagnosis, plan and need for follow up with the patient.     New Prescriptions    CYCLOBENZAPRINE (FLEXERIL) 10 MG TABLET    Take 1 tablet (10 mg) by mouth 3 times daily as needed for muscle spasms       Final diagnoses:   Acute pain of right shoulder       10/21/2019   [facility], [place], EMERGENCY DEPARTMENT     Jeremy Morales MD  10/21/19 0729

## 2019-10-21 NOTE — ED AVS SNAPSHOT
[facility], [place], Emergency Department  [address] [place] [id number]  Phone:  [phone]                                    Elina Hood   MRN: [id number]    Department:  [facility], Emergency Department   Date of Visit:  10/21/2019           After Visit Summary Signature Page    I have received my discharge instructions, and my questions have been answered. I have discussed any challenges I see with this plan with the nurse or doctor.    ..........................................................................................................................................  Patient/Patient Representative Signature      ..........................................................................................................................................  Patient Representative Print Name and Relationship to Patient    ..................................................               ................................................  Date                                   Time    ..........................................................................................................................................  Reviewed by Signature/Title    ...................................................              ..............................................  Date                                               Time          22EPIC Rev 08/18

## 2019-10-29 ENCOUNTER — MEDICAL CORRESPONDENCE (OUTPATIENT)
Dept: HEALTH INFORMATION MANAGEMENT | Facility: CLINIC | Age: 39
End: 2019-10-29

## 2019-11-04 ENCOUNTER — DOCUMENTATION ONLY (OUTPATIENT)
Dept: ORTHOPEDICS | Facility: CLINIC | Age: 39
End: 2019-11-04

## 2019-11-04 NOTE — PROGRESS NOTES
Completed Request for Medical Opinion and faxed to [facility] at [phone]. Submitted original to Medical Records for scanning. Patient alerted via email.

## 2019-11-13 ENCOUNTER — OFFICE VISIT (OUTPATIENT)
Dept: ORTHOPEDICS | Facility: CLINIC | Age: 39
End: 2019-11-13
Payer: COMMERCIAL

## 2019-11-13 DIAGNOSIS — M65.311 BILATERAL TRIGGER THUMB: Primary | ICD-10-CM

## 2019-11-13 DIAGNOSIS — M65.312 BILATERAL TRIGGER THUMB: Primary | ICD-10-CM

## 2019-11-13 NOTE — PROGRESS NOTES
[facility]  Orthopedics  Nelson Medrano MD  2019     Name: Elina Hood  MRN: [id number]  Age: 38 year old  : 1980  Referring provider: Nelson Medrano     Chief Complaint: s/p bilateral trigger thumb release 10/03/19    Date of Surgery: 10/03/19    Procedure: bilateral trigger thumb release    History of Present Illness:   Elina Hood is a 38 year old female 6 weeks status-post the above procedure who presents for postoperative evaluation.    She is doing well today with no complaints. She says she feels good and that she is regaining good motion. She has had no further locking episodes or triggering.  She would like to know about any work restrictions.    Physical Examination:   General: Healthy appearing female. Affect appropriate. Normal gait. Alert and oriented to surroundings.   Incisions well healed  SILT thorughout  Good thumb motion with no triggering  5/5 EPL/FPL  Fingers wwp       Assessment:   38 year old female 6 weeks s/p the above procedure. She is doing well and progressing nicely.     Plan:   - talked with patient about continuing to work on motion at home and massage scar tissue  -She does not feel that formal OT needed at this time  - No work restrictions at this time.  - Patient can follow up as needed.    Lizette Abbott  Medical Student      I was present with the medical student who participated in the service and in the documentationof the note. I have verified the history and personally performed the physical exam and medical decision making. I agree with the assessment and plan of care as document in the note. -Nelson Medrano MD       I, Fco Lou, a scribe, prepared the chart for today's encounter.

## 2019-11-13 NOTE — NURSING NOTE
"Reason For Visit:   Chief Complaint   Patient presents with     RECHECK     DOS 10/3/19 Bilateral thumb trigger release       Primary MD: Marleen Santiago    Age: 38 year old    ?  No      LMP  (LMP Unknown)       Pain Assessment  Patient Currently in Pain: Yes  0-10 Pain Scale: 3               QuickDASH Assessment  No flowsheet data found.       Current Outpatient Medications   Medication Sig Dispense Refill     albuterol (ACCUNEB) 1.25 MG/3ML nebulizer solution Take 1 vial by nebulization every 6 hours as needed for shortness of breath / dyspnea or wheezing       aspirin 325 MG tablet Take 325 mg by mouth daily as needed for pain        cetirizine (ZYRTEC) 10 MG tablet Take 10 mg by mouth daily as needed for allergies       HYDROcodone-acetaminophen (NORCO) 5-325 MG tablet Take 1 tablet by mouth every 6 hours as needed for severe pain 10 tablet 0     ibuprofen (ADVIL/MOTRIN) 200 MG tablet Take 400 mg by mouth every 4 hours as needed for mild pain       losartan-hydrochlorothiazide (HYZAAR) 100-12.5 MG per tablet Take 1 tablet by mouth daily Patient states she only takes a half a tab per day (\"makes me go to the bathroom too much\")       omeprazole (PRILOSEC) 20 MG CR capsule Take 20 mg by mouth daily as needed (heartburn)       carvedilol (COREG) 25 MG tablet Take 1 tablet (25 mg) by mouth 2 times daily 60 tablet 0       Allergies   Allergen Reactions     Nuts Other (See Comments)     glands in neck swell, pain in ears (peanut butter)     Peanut Oil Swelling     Peanut Butter Flavor Swelling     Mild, still eats it though       Tonia Gonzalez, ATC    "

## 2019-11-13 NOTE — LETTER
2019       RE: Elina Hood  [address]  [facility] [address]     Dear Colleague,    Thank you for referring your patient, Elina Hood, to the [facility] at [facility]. Please see a copy of my visit note below.    [facility]  Orthopedics  Nelson Medrano MD  2019     Name: Elina Hood  MRN: [id number]  Age: 38 year old  : 1980  Referring provider: Nelson Medrano     Chief Complaint: s/p bilateral trigger thumb release 10/03/19    Date of Surgery: 10/03/19    Procedure: bilateral trigger thumb release    History of Present Illness:   Elina Hood is a 38 year old female 6 weeks status-post the above procedure who presents for postoperative evaluation.    She is doing well today with no complaints. She says she feels good and that she is regaining good motion. She has had no further locking episodes or triggering.  She would like to know about any work restrictions.    Physical Examination:   General: Healthy appearing female. Affect appropriate. Normal gait. Alert and oriented to surroundings.   Incisions well healed  SILT thorughout  Good thumb motion with no triggering  5/5 EPL/FPL  Fingers wwp       Assessment:   38 year old female 6 weeks s/p the above procedure. She is doing well and progressing nicely.     Plan:   - talked with patient about continuing to work on motion at home and massage scar tissue  -She does not feel that formal OT needed at this time  - No work restrictions at this time.  - Patient can follow up as needed.    Lizette Abbott  Medical Student    I was present with the medical student who participated in the service and in the documentationof the note. I have verified the history and personally performed the physical exam and medical decision making. I agree with the assessment and plan of care as document in the note.     -Nelson BAUMANN I, Fco Lou, a scribe, prepared the  chart for today's encounter.

## 2019-11-18 ENCOUNTER — HOSPITAL ENCOUNTER (EMERGENCY)
Facility: CLINIC | Age: 39
Discharge: HOME | End: 2019-11-18
Attending: EMERGENCY MEDICINE | Admitting: EMERGENCY MEDICINE
Payer: COMMERCIAL

## 2019-11-18 ENCOUNTER — APPOINTMENT (OUTPATIENT)
Dept: CT IMAGING | Facility: CLINIC | Age: 39
End: 2019-11-18
Attending: EMERGENCY MEDICINE
Payer: COMMERCIAL

## 2019-11-18 VITALS
OXYGEN SATURATION: 99 % | DIASTOLIC BLOOD PRESSURE: 104 MMHG | TEMPERATURE: 98.8 F | HEART RATE: 86 BPM | SYSTOLIC BLOOD PRESSURE: 163 MMHG | RESPIRATION RATE: 16 BRPM

## 2019-11-18 DIAGNOSIS — I10 HYPERTENSION, UNSPECIFIED TYPE: ICD-10-CM

## 2019-11-18 DIAGNOSIS — R09.89 TENDER LYMPH NODE: ICD-10-CM

## 2019-11-18 LAB
ANION GAP SERPL CALCULATED.3IONS-SCNC: 4 MMOL/L (ref 3–14)
BASOPHILS # BLD AUTO: 0 10E9/L (ref 0–0.2)
BASOPHILS NFR BLD AUTO: 0.4 %
BUN SERPL-MCNC: 23 MG/DL (ref 7–30)
CALCIUM SERPL-MCNC: 9.5 MG/DL (ref 8.5–10.1)
CHLORIDE SERPL-SCNC: 109 MMOL/L (ref 94–109)
CO2 SERPL-SCNC: 27 MMOL/L (ref 20–32)
CREAT SERPL-MCNC: 0.78 MG/DL (ref 0.52–1.04)
DIFFERENTIAL METHOD BLD: ABNORMAL
EOSINOPHIL # BLD AUTO: 0.3 10E9/L (ref 0–0.7)
EOSINOPHIL NFR BLD AUTO: 4.4 %
ERYTHROCYTE [DISTWIDTH] IN BLOOD BY AUTOMATED COUNT: 13.2 % (ref 10–15)
GFR SERPL CREATININE-BSD FRML MDRD: >90 ML/MIN/{1.73_M2}
GLUCOSE SERPL-MCNC: 97 MG/DL (ref 70–99)
HCT VFR BLD AUTO: 41 % (ref 35–47)
HGB BLD-MCNC: 12.5 G/DL (ref 11.7–15.7)
IMM GRANULOCYTES # BLD: 0 10E9/L (ref 0–0.4)
IMM GRANULOCYTES NFR BLD: 0.3 %
LYMPHOCYTES # BLD AUTO: 1.9 10E9/L (ref 0.8–5.3)
LYMPHOCYTES NFR BLD AUTO: 24.2 %
MCH RBC QN AUTO: 26.7 PG (ref 26.5–33)
MCHC RBC AUTO-ENTMCNC: 30.5 G/DL (ref 31.5–36.5)
MCV RBC AUTO: 88 FL (ref 78–100)
MONOCYTES # BLD AUTO: 0.8 10E9/L (ref 0–1.3)
MONOCYTES NFR BLD AUTO: 10.8 %
NEUTROPHILS # BLD AUTO: 4.7 10E9/L (ref 1.6–8.3)
NEUTROPHILS NFR BLD AUTO: 59.9 %
NRBC # BLD AUTO: 0 10*3/UL
NRBC BLD AUTO-RTO: 0 /100
PLATELET # BLD AUTO: 252 10E9/L (ref 150–450)
POTASSIUM SERPL-SCNC: 3.9 MMOL/L (ref 3.4–5.3)
RBC # BLD AUTO: 4.68 10E12/L (ref 3.8–5.2)
SODIUM SERPL-SCNC: 140 MMOL/L (ref 133–144)
WBC # BLD AUTO: 7.8 10E9/L (ref 4–11)

## 2019-11-18 PROCEDURE — 25000132 ZZH RX MED GY IP 250 OP 250 PS 637: Performed by: EMERGENCY MEDICINE

## 2019-11-18 PROCEDURE — 25000128 H RX IP 250 OP 636: Performed by: EMERGENCY MEDICINE

## 2019-11-18 PROCEDURE — 25000128 H RX IP 250 OP 636: Performed by: STUDENT IN AN ORGANIZED HEALTH CARE EDUCATION/TRAINING PROGRAM

## 2019-11-18 PROCEDURE — 96374 THER/PROPH/DIAG INJ IV PUSH: CPT | Mod: 59 | Performed by: EMERGENCY MEDICINE

## 2019-11-18 PROCEDURE — 99284 EMERGENCY DEPT VISIT MOD MDM: CPT | Mod: 25 | Performed by: EMERGENCY MEDICINE

## 2019-11-18 PROCEDURE — 80048 BASIC METABOLIC PNL TOTAL CA: CPT | Performed by: EMERGENCY MEDICINE

## 2019-11-18 PROCEDURE — 99284 EMERGENCY DEPT VISIT MOD MDM: CPT | Mod: Z6 | Performed by: EMERGENCY MEDICINE

## 2019-11-18 PROCEDURE — 85025 COMPLETE CBC W/AUTO DIFF WBC: CPT | Performed by: EMERGENCY MEDICINE

## 2019-11-18 PROCEDURE — 71260 CT THORAX DX C+: CPT

## 2019-11-18 RX ORDER — IOPAMIDOL 755 MG/ML
100 INJECTION, SOLUTION INTRAVASCULAR ONCE
Status: COMPLETED | OUTPATIENT
Start: 2019-11-18 | End: 2019-11-18

## 2019-11-18 RX ORDER — KETOROLAC TROMETHAMINE 30 MG/ML
30 INJECTION, SOLUTION INTRAMUSCULAR; INTRAVENOUS ONCE
Status: COMPLETED | OUTPATIENT
Start: 2019-11-18 | End: 2019-11-18

## 2019-11-18 RX ORDER — LOSARTAN POTASSIUM AND HYDROCHLOROTHIAZIDE 12.5; 1 MG/1; MG/1
1 TABLET ORAL DAILY
Status: DISCONTINUED | OUTPATIENT
Start: 2019-11-18 | End: 2019-11-18 | Stop reason: HOSPADM

## 2019-11-18 RX ORDER — FENTANYL CITRATE 50 UG/ML
25 INJECTION, SOLUTION INTRAMUSCULAR; INTRAVENOUS ONCE
Status: DISCONTINUED | OUTPATIENT
Start: 2019-11-18 | End: 2019-11-18

## 2019-11-18 RX ORDER — IBUPROFEN 600 MG/1
600 TABLET, FILM COATED ORAL EVERY 8 HOURS PRN
Qty: 60 TABLET | Refills: 0 | Status: SHIPPED | OUTPATIENT
Start: 2019-11-18 | End: 2019-11-26

## 2019-11-18 RX ADMIN — KETOROLAC TROMETHAMINE 30 MG: 30 INJECTION, SOLUTION INTRAMUSCULAR at 10:08

## 2019-11-18 RX ADMIN — LOSARTAN POTASSIUM AND HYDROCHLOROTHIAZIDE 1 TABLET: 100; 12.5 TABLET, FILM COATED ORAL at 09:46

## 2019-11-18 RX ADMIN — IOPAMIDOL 100 ML: 755 INJECTION, SOLUTION INTRAVENOUS at 10:19

## 2019-11-18 ASSESSMENT — ENCOUNTER SYMPTOMS
HEADACHES: 1
RHINORRHEA: 0
SHORTNESS OF BREATH: 0
COUGH: 0
FEVER: 0
DYSURIA: 0

## 2019-11-18 NOTE — ED AVS SNAPSHOT
[facility], [place], Emergency Department  [address] [place] [id number]  Phone:  [phone]                                    Elina Hood   MRN: [id number]    Department:  [facility], Emergency Department   Date of Visit:  11/18/2019           After Visit Summary Signature Page    I have received my discharge instructions, and my questions have been answered. I have discussed any challenges I see with this plan with the nurse or doctor.    ..........................................................................................................................................  Patient/Patient Representative Signature      ..........................................................................................................................................  Patient Representative Print Name and Relationship to Patient    ..................................................               ................................................  Date                                   Time    ..........................................................................................................................................  Reviewed by Signature/Title    ...................................................              ..............................................  Date                                               Time          22EPIC Rev 08/18

## 2019-11-18 NOTE — DISCHARGE INSTRUCTIONS
Follow-up with your primary care provider.  Return to the emergency department as needed for any new or worsening symptoms.

## 2019-11-18 NOTE — ED TRIAGE NOTES
"Presented with c/o of painful lump in right collar bone making it difficult to \"even put a bra on,\" that began 2-4 days ago. Also reports headache that feels like a \"pumping pressure.\" On two BP meds but had not taken one for over a week. /126.    "

## 2019-11-18 NOTE — ED PROVIDER NOTES
[place] EMERGENCY DEPARTMENT ([facility])  11/18/19    History     Chief Complaint   Patient presents with     Arm Pain     The history is provided by the patient and medical records.     Elina Hood is a 38 year old female with a past medical history significant for s/p bilateral trigger finger release procedure (10/3/19), hypertension, chronic pancreatitis, inflammatory polyarthritis, cholecystitis s/p cholecystectomy, GERD, and depression who presents to the Emergency Department for evaluation of a lump in her right collar bone. Patient states that she observed the lump develop two weeks ago and notes that it has been increasingly painful. Patient also endorses a throbbing headache. She denies any history of cancer but endorses a prior history of abdominal wall cutaneous benign cysts. No shortness of breath, fever, runny nose, significant cough, or dysuria.  Of note, patient reports that she is prescribed to take hydro-chlorothiazide/losartan but she has not picked it up yet.  She does endorses taking her regular dose of carvedilol today.       I have reviewed the Medications, Allergies, Past Medical and Surgical History, and Social History in the [employer] system.  Past Medical History:   Diagnosis Date     Allergic state      GERD (gastroesophageal reflux disease)      Hypertension        Past Surgical History:   Procedure Laterality Date     RELEASE TRIGGER FINGER BILATERAL Bilateral 10/3/2019    Procedure: Bilateral Thumb Trigger Finger Release;  Surgeon: Nelson Medrano MD;  Location:  OR       History reviewed. No pertinent family history.    Social History     Tobacco Use     Smoking status: Never Smoker     Smokeless tobacco: Never Used   Substance Use Topics     Alcohol use: Yes     Comment: rarely        Review of Systems   Constitutional: Negative for fever.   HENT: Negative for rhinorrhea.    Respiratory: Negative for cough and shortness of breath.    Cardiovascular: Negative for  chest pain.   Genitourinary: Negative for dysuria.   Skin:        Positive for lump in right collar bone   Neurological: Positive for headaches.       Physical Exam   BP: (!) 190/126  Pulse: 89  Temp: 98.8  F (37.1  C)  Resp: 16  SpO2: 100 %      Physical Exam  Constitutional:       General: She is not in acute distress.     Appearance: She is not diaphoretic.   HENT:      Head: Atraumatic.      Mouth/Throat:      Pharynx: No oropharyngeal exudate.   Eyes:      General: No scleral icterus.  Neck:      Musculoskeletal: Neck supple.   Cardiovascular:      Rate and Rhythm: Normal rate and regular rhythm.      Heart sounds: Normal heart sounds.   Pulmonary:      Effort: No respiratory distress.      Breath sounds: Normal breath sounds.   Abdominal:      General: There is no distension.      Palpations: Abdomen is soft.      Tenderness: There is no abdominal tenderness.   Musculoskeletal:         General: No deformity.   Skin:     General: Skin is warm and dry.      Comments: On the right supraclavicular region there is a small painful mass that is consistent with a possible enlarged lymph node.  No overlying cellulitis or evidence of abscess   Neurological:      Mental Status: She is alert.   Psychiatric:         Behavior: Behavior normal.         ED Course   8:44 AM  The patient was seen and examined by Job Cho DO in Room ED33.        Procedures           Labs Ordered and Resulted from Time of ED Arrival Up to the Time of Departure from the ED   CBC WITH PLATELETS DIFFERENTIAL - Abnormal; Notable for the following components:       Result Value    MCHC 30.5 (*)     All other components within normal limits   BASIC METABOLIC PANEL   PERIPHERAL IV CATHETER     Results for orders placed or performed during the hospital encounter of 11/18/19 (from the past 24 hour(s))   CBC with platelets differential   Result Value Ref Range    WBC 7.8 4.0 - 11.0 10e9/L    RBC Count 4.68 3.8 - 5.2 10e12/L    Hemoglobin 12.5 11.7  - 15.7 g/dL    Hematocrit 41.0 35.0 - 47.0 %    MCV 88 78 - 100 fl    MCH 26.7 26.5 - 33.0 pg    MCHC 30.5 (L) 31.5 - 36.5 g/dL    RDW 13.2 10.0 - 15.0 %    Platelet Count 252 150 - 450 10e9/L    Diff Method Automated Method     % Neutrophils 59.9 %    % Lymphocytes 24.2 %    % Monocytes 10.8 %    % Eosinophils 4.4 %    % Basophils 0.4 %    % Immature Granulocytes 0.3 %    Nucleated RBCs 0 0 /100    Absolute Neutrophil 4.7 1.6 - 8.3 10e9/L    Absolute Lymphocytes 1.9 0.8 - 5.3 10e9/L    Absolute Monocytes 0.8 0.0 - 1.3 10e9/L    Absolute Eosinophils 0.3 0.0 - 0.7 10e9/L    Absolute Basophils 0.0 0.0 - 0.2 10e9/L    Abs Immature Granulocytes 0.0 0 - 0.4 10e9/L    Absolute Nucleated RBC 0.0    Basic metabolic panel   Result Value Ref Range    Sodium 140 133 - 144 mmol/L    Potassium 3.9 3.4 - 5.3 mmol/L    Chloride 109 94 - 109 mmol/L    Carbon Dioxide 27 20 - 32 mmol/L    Anion Gap 4 3 - 14 mmol/L    Glucose 97 70 - 99 mg/dL    Urea Nitrogen 23 7 - 30 mg/dL    Creatinine 0.78 0.52 - 1.04 mg/dL    GFR Estimate >90 >60 mL/min/[1.73_m2]    GFR Estimate If Black >90 >60 mL/min/[1.73_m2]    Calcium 9.5 8.5 - 10.1 mg/dL   CT Chest w Contrast    Impression    IMPRESSION:   1. No supraclavicular adenopathy. No suspicious mediastinal lymph  nodes.  2. Clear lungs.         Results for orders placed or performed during the hospital encounter of 11/18/19   CBC with platelets differential     Status: Abnormal   Result Value Ref Range    WBC 7.8 4.0 - 11.0 10e9/L    RBC Count 4.68 3.8 - 5.2 10e12/L    Hemoglobin 12.5 11.7 - 15.7 g/dL    Hematocrit 41.0 35.0 - 47.0 %    MCV 88 78 - 100 fl    MCH 26.7 26.5 - 33.0 pg    MCHC 30.5 (L) 31.5 - 36.5 g/dL    RDW 13.2 10.0 - 15.0 %    Platelet Count 252 150 - 450 10e9/L    Diff Method Automated Method     % Neutrophils 59.9 %    % Lymphocytes 24.2 %    % Monocytes 10.8 %    % Eosinophils 4.4 %    % Basophils 0.4 %    % Immature Granulocytes 0.3 %    Nucleated RBCs 0 0 /100    Absolute  Neutrophil 4.7 1.6 - 8.3 10e9/L    Absolute Lymphocytes 1.9 0.8 - 5.3 10e9/L    Absolute Monocytes 0.8 0.0 - 1.3 10e9/L    Absolute Eosinophils 0.3 0.0 - 0.7 10e9/L    Absolute Basophils 0.0 0.0 - 0.2 10e9/L    Abs Immature Granulocytes 0.0 0 - 0.4 10e9/L    Absolute Nucleated RBC 0.0    Basic metabolic panel     Status: None   Result Value Ref Range    Sodium 140 133 - 144 mmol/L    Potassium 3.9 3.4 - 5.3 mmol/L    Chloride 109 94 - 109 mmol/L    Carbon Dioxide 27 20 - 32 mmol/L    Anion Gap 4 3 - 14 mmol/L    Glucose 97 70 - 99 mg/dL    Urea Nitrogen 23 7 - 30 mg/dL    Creatinine 0.78 0.52 - 1.04 mg/dL    GFR Estimate >90 >60 mL/min/[1.73_m2]    GFR Estimate If Black >90 >60 mL/min/[1.73_m2]    Calcium 9.5 8.5 - 10.1 mg/dL            Assessments & Plan (with Medical Decision Making)   38-year-old female presents to us with a chief complaint of supraclavicular pain and headache.  Differential includes but not limited to muscular skeletal pain, enlarged lymph nodes, infectious process, lymphoma, hypertensive urgency, medication noncompliance.  We did give the patient home dose of her losartan/hydrochlorothiazide that she had not taken.  Labs were ordered in addition to CT scan of her chest.  Labs are unremarkable.  Patient is not anemic and does not have any leukocytosis.  BMP was normal with normal kidney function.  CT scan it showed no evidence of extensive lymphadenopathy.  Patient's blood pressure improved with her home dose of hydrochlorothiazide losartan and that she had a for a week.  Her headache improved as well with this.  Based on the normal CT I suspect this is just a single tender enlarged lymph node.  There is no evidence of abscess or other infectious process on exam or on CT.  Recommend she continue ibuprofen and follow-up with her primary care.  Patient is comfortable with discharge home and the plan.  She was encouraged to  her prescription for losartan hydrochlorothiazide    I have  reviewed the nursing notes.    I have reviewed the findings, diagnosis, plan and need for follow up with the patient.    Discharge Medication List as of 11/18/2019 12:22 PM      START taking these medications    Details   !! ibuprofen (ADVIL/MOTRIN) 600 MG tablet Take 1 tablet (600 mg) by mouth every 8 hours as needed for moderate pain, Disp-60 tablet, R-0, Local Print       !! - Potential duplicate medications found. Please discuss with provider.          Final diagnoses:   Hypertension, unspecified type   Tender lymph node     IHermann, am serving as a trained medical scribe to document services personally performed by  Job Cho DO, based on the provider's statements to me.      I, Job Cho DO, was physically present and have reviewed and verified the accuracy of this note documented by Hermann Chavez.     11/18/2019   [facility], [place], EMERGENCY DEPARTMENT     Job Cho DO  11/18/19 [id number]

## 2020-01-27 ENCOUNTER — HOSPITAL ENCOUNTER (EMERGENCY)
Facility: CLINIC | Age: 40
Discharge: HOME | End: 2020-01-27
Attending: EMERGENCY MEDICINE | Admitting: EMERGENCY MEDICINE
Payer: COMMERCIAL

## 2020-01-27 ENCOUNTER — APPOINTMENT (OUTPATIENT)
Dept: GENERAL RADIOLOGY | Facility: CLINIC | Age: 40
End: 2020-01-27
Attending: EMERGENCY MEDICINE
Payer: COMMERCIAL

## 2020-01-27 VITALS
HEIGHT: 64 IN | TEMPERATURE: 98.8 F | DIASTOLIC BLOOD PRESSURE: 128 MMHG | SYSTOLIC BLOOD PRESSURE: 185 MMHG | OXYGEN SATURATION: 98 % | BODY MASS INDEX: 37.56 KG/M2 | HEART RATE: 95 BPM | RESPIRATION RATE: 16 BRPM | WEIGHT: 220 LBS

## 2020-01-27 DIAGNOSIS — M72.2 PLANTAR FASCIITIS: ICD-10-CM

## 2020-01-27 PROCEDURE — 73630 X-RAY EXAM OF FOOT: CPT | Mod: LT

## 2020-01-27 PROCEDURE — 99283 EMERGENCY DEPT VISIT LOW MDM: CPT | Performed by: EMERGENCY MEDICINE

## 2020-01-27 PROCEDURE — 99282 EMERGENCY DEPT VISIT SF MDM: CPT | Mod: Z6 | Performed by: EMERGENCY MEDICINE

## 2020-01-27 ASSESSMENT — MIFFLIN-ST. JEOR: SCORE: 1647.91

## 2020-01-27 ASSESSMENT — ENCOUNTER SYMPTOMS: FEVER: 0

## 2020-01-27 NOTE — DISCHARGE INSTRUCTIONS
Please make an appointment to follow up with Sports Medicine (phone: [phone]) as soon as possible if not improving.

## 2020-01-27 NOTE — ED PROVIDER NOTES
History   No chief complaint on file.    CHRISTINE Hood is a 39 year old female with a history of HTN, chronic pancreatitis, inflammatory polyarthritis, cholecystitis status post cholecystectomy, GERD, and depression who presents to the Emergency Department today for evaluation of left foot pain.  The patient reports that she previously worked at [employer] where she developed pain on the bottom of her left foot.  [place] much more and notes that her pain is continue to worsen.  She states that her pain is worse in the morning upon waking up.  The patient denies pain on the top of her foot.  She denies pain in her right foot.  The patient denies any trips or falls to cause this pain.  The patient otherwise has a high blood pressure and notes that she has been taking her hypertension medications.  She reports that she did miss a recent follow-up for this and will schedule an appointment with her primary care provider.    I have reviewed the Medications, Allergies, Past Medical and Surgical History, and Social History in the [employer] system.    Past Medical History:   Diagnosis Date     Allergic state      GERD (gastroesophageal reflux disease)      Hypertension        Past Surgical History:   Procedure Laterality Date     RELEASE TRIGGER FINGER BILATERAL Bilateral 10/3/2019    Procedure: Bilateral Thumb Trigger Finger Release;  Surgeon: Nelson Medrano MD;  Location: [facility] OR       No family history on file.    Social History     Tobacco Use     Smoking status: Never Smoker     Smokeless tobacco: Never Used   Substance Use Topics     Alcohol use: Yes     Comment: rarely        Current Facility-Administered Medications   Medication     lidocaine 1 % injection 0.5 mL     triamcinolone (KENALOG-40) injection 20 mg     Current Outpatient Medications   Medication     albuterol (ACCUNEB) 1.25 MG/3ML nebulizer solution     aspirin 325 MG tablet     carvedilol (COREG)  "25 MG tablet     cetirizine (ZYRTEC) 10 MG tablet     HYDROcodone-acetaminophen (NORCO) 5-325 MG tablet     ibuprofen (ADVIL/MOTRIN) 200 MG tablet     losartan-hydrochlorothiazide (HYZAAR) 100-12.5 MG per tablet     omeprazole (PRILOSEC) 20 MG CR capsule     Allergies   Allergen Reactions     Nuts Other (See Comments)     glands in neck swell, pain in ears (peanut butter)     Peanut Oil Swelling     Peanut Butter Flavor Swelling     Mild, still eats it though      Review of Systems   Constitutional: Negative for fever.   Musculoskeletal:        Left foot pain   All other systems reviewed and are negative.    Physical Exam   BP: (!) 206/109  Pulse: 96  Heart Rate: 96  Temp: 98.8  F (37.1  C)  Resp: 15  Height: 162.6 cm (5' 4\")  Weight: 99.8 kg (220 lb)  SpO2: 99 %      Physical Exam  Vitals signs and nursing note reviewed.   Constitutional:       General: She is not in acute distress.     Appearance: She is well-developed.   HENT:      Head: Normocephalic.   Neck:      Musculoskeletal: Neck supple.   Pulmonary:      Effort: No respiratory distress.   Abdominal:      General: There is no distension.   Musculoskeletal:         General: No deformity.      Comments: Left foot no evidence of swelling, erythema, other abnormality visually.  Patient does have pain on palpation of the plantar aspect.  Pain does increase when the toes are dorsiflexed.   Skin:     General: Skin is dry.   Neurological:      Mental Status: She is alert.         ED Course   11:35 AM  The patient was seen and examined by Dr. Cho in Room 17.       Procedures           Labs Ordered and Resulted from Time of ED Arrival Up to the Time of Departure from the ED - No data to display         Assessments & Plan (with Medical Decision Making)   39-year-old female presents office with a chief complaint of left foot  Pain.  Differential includes but not limited to stress fracture, plantar fasciitis, ligamentous strain.  There is no fever, redness, other " evidence of a infectious process.  X-ray shows no evidence of fracture.  Patient's history is consistent with plantar fasciitis.  Patient notes that the pain is much much worse in the morning and improves somewhat during the day.  Will recommend she get some over-the-counter gel inserts as this may help with her symptoms and give her the follow-up number for sports medicine.    I have reviewed the nursing notes.    I have reviewed the findings, diagnosis, plan and need for follow up with the patient.    New Prescriptions    No medications on file       Final diagnoses:   Plantar fasciitis   I, Clement Villareal, am serving as a trained medical scribe to document services personally performed by Job Cho DO, based on the provider's statements to me.   IJob DO, was physically present and have reviewed and verified the accuracy of this note documented by Clement Villareal.    1/27/2020   [facility], [place], EMERGENCY DEPARTMENT     Job Cho DO  01/27/20 1438

## 2020-01-27 NOTE — ED AVS SNAPSHOT
[facility], [place], Emergency Department  [address] [place] [id number]  Phone:  [phone]                                    Elina Hood   MRN: [id number]    Department:  [facility], Emergency Department   Date of Visit:  1/27/2020           After Visit Summary Signature Page    I have received my discharge instructions, and my questions have been answered. I have discussed any challenges I see with this plan with the nurse or doctor.    ..........................................................................................................................................  Patient/Patient Representative Signature      ..........................................................................................................................................  Patient Representative Print Name and Relationship to Patient    ..................................................               ................................................  Date                                   Time    ..........................................................................................................................................  Reviewed by Signature/Title    ...................................................              ..............................................  Date                                               Time          22EPIC Rev 08/18

## 2020-11-14 ENCOUNTER — HEALTH MAINTENANCE LETTER (OUTPATIENT)
Age: 40
End: 2020-11-14

## 2021-09-12 ENCOUNTER — HEALTH MAINTENANCE LETTER (OUTPATIENT)
Age: 41
End: 2021-09-12

## 2021-12-10 ENCOUNTER — HOSPITAL ENCOUNTER (EMERGENCY)
Facility: CLINIC | Age: 41
Discharge: HOME | End: 2021-12-10
Attending: EMERGENCY MEDICINE | Admitting: EMERGENCY MEDICINE
Payer: COMMERCIAL

## 2021-12-10 ENCOUNTER — APPOINTMENT (OUTPATIENT)
Dept: GENERAL RADIOLOGY | Facility: CLINIC | Age: 41
End: 2021-12-10
Attending: EMERGENCY MEDICINE
Payer: COMMERCIAL

## 2021-12-10 ENCOUNTER — APPOINTMENT (OUTPATIENT)
Dept: GENERAL RADIOLOGY | Facility: CLINIC | Age: 41
End: 2021-12-10
Payer: COMMERCIAL

## 2021-12-10 ENCOUNTER — APPOINTMENT (OUTPATIENT)
Dept: CT IMAGING | Facility: CLINIC | Age: 41
End: 2021-12-10
Payer: COMMERCIAL

## 2021-12-10 VITALS
TEMPERATURE: 99.3 F | OXYGEN SATURATION: 99 % | SYSTOLIC BLOOD PRESSURE: 198 MMHG | RESPIRATION RATE: 18 BRPM | HEART RATE: 98 BPM | DIASTOLIC BLOOD PRESSURE: 104 MMHG

## 2021-12-10 DIAGNOSIS — Z04.1: ICD-10-CM

## 2021-12-10 LAB
ALBUMIN SERPL-MCNC: 3 G/DL (ref 3.4–5)
ALP SERPL-CCNC: 78 U/L (ref 40–150)
ALT SERPL W P-5'-P-CCNC: 25 U/L (ref 0–50)
ANION GAP SERPL CALCULATED.3IONS-SCNC: 4 MMOL/L (ref 3–14)
APTT PPP: 21 SECONDS (ref 22–38)
AST SERPL W P-5'-P-CCNC: 44 U/L (ref 0–45)
ATRIAL RATE - MUSE: 98 BPM
BASOPHILS # BLD AUTO: 0 10E3/UL (ref 0–0.2)
BASOPHILS NFR BLD AUTO: 0 %
BILIRUB SERPL-MCNC: 0.4 MG/DL (ref 0.2–1.3)
BUN SERPL-MCNC: 14 MG/DL (ref 7–30)
CALCIUM SERPL-MCNC: 9.1 MG/DL (ref 8.5–10.1)
CHLORIDE BLD-SCNC: 107 MMOL/L (ref 94–109)
CO2 SERPL-SCNC: 26 MMOL/L (ref 20–32)
CREAT SERPL-MCNC: 1.02 MG/DL (ref 0.52–1.04)
DIASTOLIC BLOOD PRESSURE - MUSE: NORMAL MMHG
EOSINOPHIL # BLD AUTO: 0.4 10E3/UL (ref 0–0.7)
EOSINOPHIL NFR BLD AUTO: 4 %
ERYTHROCYTE [DISTWIDTH] IN BLOOD BY AUTOMATED COUNT: 13 % (ref 10–15)
GFR SERPL CREATININE-BSD FRML MDRD: 68 ML/MIN/1.73M2
GLUCOSE BLD-MCNC: 96 MG/DL (ref 70–99)
HCT VFR BLD AUTO: 38.3 % (ref 35–47)
HGB BLD-MCNC: 12.1 G/DL (ref 11.7–15.7)
IMM GRANULOCYTES # BLD: 0 10E3/UL
IMM GRANULOCYTES NFR BLD: 0 %
INR PPP: 0.98 (ref 0.85–1.15)
INTERPRETATION ECG - MUSE: NORMAL
LIPASE SERPL-CCNC: 106 U/L (ref 73–393)
LYMPHOCYTES # BLD AUTO: 3 10E3/UL (ref 0.8–5.3)
LYMPHOCYTES NFR BLD AUTO: 29 %
MCH RBC QN AUTO: 26.8 PG (ref 26.5–33)
MCHC RBC AUTO-ENTMCNC: 31.6 G/DL (ref 31.5–36.5)
MCV RBC AUTO: 85 FL (ref 78–100)
MONOCYTES # BLD AUTO: 1 10E3/UL (ref 0–1.3)
MONOCYTES NFR BLD AUTO: 9 %
NEUTROPHILS # BLD AUTO: 6.1 10E3/UL (ref 1.6–8.3)
NEUTROPHILS NFR BLD AUTO: 58 %
NRBC # BLD AUTO: 0 10E3/UL
NRBC BLD AUTO-RTO: 0 /100
P AXIS - MUSE: 47 DEGREES
PLATELET # BLD AUTO: 241 10E3/UL (ref 150–450)
POTASSIUM BLD-SCNC: 4.5 MMOL/L (ref 3.4–5.3)
PR INTERVAL - MUSE: 144 MS
PROT SERPL-MCNC: 8.1 G/DL (ref 6.8–8.8)
QRS DURATION - MUSE: 88 MS
QT - MUSE: 374 MS
QTC - MUSE: 477 MS
R AXIS - MUSE: 5 DEGREES
RBC # BLD AUTO: 4.51 10E6/UL (ref 3.8–5.2)
SODIUM SERPL-SCNC: 137 MMOL/L (ref 133–144)
SYSTOLIC BLOOD PRESSURE - MUSE: NORMAL MMHG
T AXIS - MUSE: 44 DEGREES
VENTRICULAR RATE- MUSE: 98 BPM
WBC # BLD AUTO: 10.6 10E3/UL (ref 4–11)

## 2021-12-10 PROCEDURE — 96374 THER/PROPH/DIAG INJ IV PUSH: CPT | Mod: 59 | Performed by: EMERGENCY MEDICINE

## 2021-12-10 PROCEDURE — 80053 COMPREHEN METABOLIC PANEL: CPT

## 2021-12-10 PROCEDURE — 83690 ASSAY OF LIPASE: CPT | Performed by: EMERGENCY MEDICINE

## 2021-12-10 PROCEDURE — 36415 COLL VENOUS BLD VENIPUNCTURE: CPT

## 2021-12-10 PROCEDURE — 85610 PROTHROMBIN TIME: CPT

## 2021-12-10 PROCEDURE — 74177 CT ABD & PELVIS W/CONTRAST: CPT | Mod: 26 | Performed by: STUDENT IN AN ORGANIZED HEALTH CARE EDUCATION/TRAINING PROGRAM

## 2021-12-10 PROCEDURE — 85730 THROMBOPLASTIN TIME PARTIAL: CPT

## 2021-12-10 PROCEDURE — 76604 US EXAM CHEST: CPT | Mod: 59 | Performed by: EMERGENCY MEDICINE

## 2021-12-10 PROCEDURE — 93308 TTE F-UP OR LMTD: CPT | Mod: 26 | Performed by: EMERGENCY MEDICINE

## 2021-12-10 PROCEDURE — 74177 CT ABD & PELVIS W/CONTRAST: CPT

## 2021-12-10 PROCEDURE — 71046 X-RAY EXAM CHEST 2 VIEWS: CPT | Mod: 26 | Performed by: STUDENT IN AN ORGANIZED HEALTH CARE EDUCATION/TRAINING PROGRAM

## 2021-12-10 PROCEDURE — 76604 US EXAM CHEST: CPT | Mod: 26 | Performed by: EMERGENCY MEDICINE

## 2021-12-10 PROCEDURE — 93308 TTE F-UP OR LMTD: CPT | Performed by: EMERGENCY MEDICINE

## 2021-12-10 PROCEDURE — 85025 COMPLETE CBC W/AUTO DIFF WBC: CPT

## 2021-12-10 PROCEDURE — 71046 X-RAY EXAM CHEST 2 VIEWS: CPT

## 2021-12-10 PROCEDURE — 250N000011 HC RX IP 250 OP 636: Performed by: EMERGENCY MEDICINE

## 2021-12-10 PROCEDURE — 76705 ECHO EXAM OF ABDOMEN: CPT | Mod: 59 | Performed by: EMERGENCY MEDICINE

## 2021-12-10 PROCEDURE — 99285 EMERGENCY DEPT VISIT HI MDM: CPT | Mod: 25 | Performed by: EMERGENCY MEDICINE

## 2021-12-10 PROCEDURE — 93005 ELECTROCARDIOGRAM TRACING: CPT | Mod: 59 | Performed by: EMERGENCY MEDICINE

## 2021-12-10 PROCEDURE — 71260 CT THORAX DX C+: CPT | Mod: 26 | Performed by: STUDENT IN AN ORGANIZED HEALTH CARE EDUCATION/TRAINING PROGRAM

## 2021-12-10 PROCEDURE — 76705 ECHO EXAM OF ABDOMEN: CPT | Mod: 26 | Performed by: EMERGENCY MEDICINE

## 2021-12-10 PROCEDURE — 73030 X-RAY EXAM OF SHOULDER: CPT | Mod: 26 | Performed by: RADIOLOGY

## 2021-12-10 PROCEDURE — 250N000011 HC RX IP 250 OP 636

## 2021-12-10 PROCEDURE — 73070 X-RAY EXAM OF ELBOW: CPT | Mod: 26 | Performed by: RADIOLOGY

## 2021-12-10 PROCEDURE — 99285 EMERGENCY DEPT VISIT HI MDM: CPT | Mod: GC | Performed by: EMERGENCY MEDICINE

## 2021-12-10 PROCEDURE — 73030 X-RAY EXAM OF SHOULDER: CPT | Mod: RT

## 2021-12-10 PROCEDURE — 93010 ELECTROCARDIOGRAM REPORT: CPT | Mod: 59 | Performed by: EMERGENCY MEDICINE

## 2021-12-10 PROCEDURE — 73070 X-RAY EXAM OF ELBOW: CPT | Mod: RT

## 2021-12-10 RX ORDER — LIDOCAINE 40 MG/G
CREAM TOPICAL
Status: DISCONTINUED | OUTPATIENT
Start: 2021-12-10 | End: 2021-12-10 | Stop reason: HOSPADM

## 2021-12-10 RX ORDER — KETOROLAC TROMETHAMINE 30 MG/ML
30 INJECTION, SOLUTION INTRAMUSCULAR; INTRAVENOUS ONCE
Status: DISCONTINUED | OUTPATIENT
Start: 2021-12-10 | End: 2021-12-10

## 2021-12-10 RX ORDER — MORPHINE SULFATE 2 MG/ML
1 INJECTION, SOLUTION INTRAMUSCULAR; INTRAVENOUS ONCE
Status: COMPLETED | OUTPATIENT
Start: 2021-12-10 | End: 2021-12-10

## 2021-12-10 RX ORDER — IOPAMIDOL 755 MG/ML
100 INJECTION, SOLUTION INTRAVASCULAR ONCE
Status: COMPLETED | OUTPATIENT
Start: 2021-12-10 | End: 2021-12-10

## 2021-12-10 RX ADMIN — IOPAMIDOL 100 ML: 755 INJECTION, SOLUTION INTRAVENOUS at 19:36

## 2021-12-10 RX ADMIN — MORPHINE SULFATE 1 MG: 2 INJECTION, SOLUTION INTRAMUSCULAR; INTRAVENOUS at 18:50

## 2021-12-10 ASSESSMENT — ENCOUNTER SYMPTOMS
MYALGIAS: 1
PALPITATIONS: 0
GASTROINTESTINAL NEGATIVE: 1
SHORTNESS OF BREATH: 1
EYES NEGATIVE: 1
NEUROLOGICAL NEGATIVE: 1
HEMATOLOGIC/LYMPHATIC NEGATIVE: 1
ARTHRALGIAS: 1
ENDOCRINE NEGATIVE: 1
CONSTITUTIONAL NEGATIVE: 1
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
## 2021-12-11 ENCOUNTER — HOSPITAL ENCOUNTER (EMERGENCY)
Facility: CLINIC | Age: 41
Discharge: HOME | End: 2021-12-11
Attending: EMERGENCY MEDICINE | Admitting: EMERGENCY MEDICINE
Payer: COMMERCIAL

## 2021-12-11 VITALS
TEMPERATURE: 98.6 F | DIASTOLIC BLOOD PRESSURE: 118 MMHG | SYSTOLIC BLOOD PRESSURE: 180 MMHG | RESPIRATION RATE: 16 BRPM | WEIGHT: 220 LBS | HEART RATE: 83 BPM | HEIGHT: 65 IN | OXYGEN SATURATION: 98 % | BODY MASS INDEX: 36.65 KG/M2

## 2021-12-11 DIAGNOSIS — I10 PRIMARY HYPERTENSION: ICD-10-CM

## 2021-12-11 DIAGNOSIS — S20.211A CHEST WALL CONTUSION, RIGHT, INITIAL ENCOUNTER: ICD-10-CM

## 2021-12-11 PROCEDURE — 250N000013 HC RX MED GY IP 250 OP 250 PS 637: Performed by: EMERGENCY MEDICINE

## 2021-12-11 PROCEDURE — 99284 EMERGENCY DEPT VISIT MOD MDM: CPT | Performed by: EMERGENCY MEDICINE

## 2021-12-11 PROCEDURE — 99283 EMERGENCY DEPT VISIT LOW MDM: CPT

## 2021-12-11 RX ORDER — HYDROCHLOROTHIAZIDE 12.5 MG/1
12.5 TABLET ORAL DAILY
Status: DISCONTINUED | OUTPATIENT
Start: 2021-12-11 | End: 2021-12-11 | Stop reason: HOSPADM

## 2021-12-11 RX ORDER — LOSARTAN POTASSIUM 100 MG/1
100 TABLET ORAL DAILY
Status: DISCONTINUED | OUTPATIENT
Start: 2021-12-11 | End: 2021-12-11 | Stop reason: HOSPADM

## 2021-12-11 RX ORDER — CARVEDILOL 25 MG/1
25 TABLET ORAL 2 TIMES DAILY WITH MEALS
Status: DISCONTINUED | OUTPATIENT
Start: 2021-12-11 | End: 2021-12-11 | Stop reason: HOSPADM

## 2021-12-11 RX ORDER — OXYCODONE AND ACETAMINOPHEN 5; 325 MG/1; MG/1
1-2 TABLET ORAL EVERY 4 HOURS PRN
Qty: 12 TABLET | Refills: 0 | Status: SHIPPED | OUTPATIENT
Start: 2021-12-11

## 2021-12-11 RX ADMIN — LOSARTAN POTASSIUM 100 MG: 100 TABLET, FILM COATED ORAL at 18:20

## 2021-12-11 RX ADMIN — CARVEDILOL 25 MG: 25 TABLET, FILM COATED ORAL at 18:19

## 2021-12-11 RX ADMIN — HYDROCHLOROTHIAZIDE 12.5 MG: 12.5 TABLET ORAL at 18:20

## 2021-12-11 ASSESSMENT — MIFFLIN-ST. JEOR: SCORE: 1653.79

## 2021-12-11 NOTE — DISCHARGE INSTRUCTIONS
You may take ibuprofen and/or acetaminophen every 6 hours as needed to manage your symptoms. Follow up with your regular doctor. Your blood pressure was quite elevated today. Continue taking your blood pressure medications as prescribed. You may need to discuss changes to your medications with your primary care doctor.
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
## 2021-12-11 NOTE — DISCHARGE INSTRUCTIONS
Cold packs  Start your blood pressure medications again  Take Percocet for more severe pain  Return if increasing shortness of breath or fever
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
## 2022-01-02 ENCOUNTER — HEALTH MAINTENANCE LETTER (OUTPATIENT)
Age: 42
End: 2022-01-02

## 2022-05-26 ENCOUNTER — APPOINTMENT (OUTPATIENT)
Dept: CT IMAGING | Facility: CLINIC | Age: 42
End: 2022-05-26
Attending: EMERGENCY MEDICINE
Payer: COMMERCIAL

## 2022-05-26 ENCOUNTER — HOSPITAL ENCOUNTER (EMERGENCY)
Facility: CLINIC | Age: 42
Discharge: HOME | End: 2022-05-26
Attending: EMERGENCY MEDICINE | Admitting: EMERGENCY MEDICINE
Payer: COMMERCIAL

## 2022-05-26 ENCOUNTER — APPOINTMENT (OUTPATIENT)
Dept: GENERAL RADIOLOGY | Facility: CLINIC | Age: 42
End: 2022-05-26
Attending: EMERGENCY MEDICINE
Payer: COMMERCIAL

## 2022-05-26 VITALS
HEART RATE: 73 BPM | WEIGHT: 210 LBS | DIASTOLIC BLOOD PRESSURE: 109 MMHG | BODY MASS INDEX: 34.95 KG/M2 | OXYGEN SATURATION: 99 % | TEMPERATURE: 98.5 F | SYSTOLIC BLOOD PRESSURE: 188 MMHG | RESPIRATION RATE: 20 BRPM

## 2022-05-26 DIAGNOSIS — G24.3 SPASMODIC TORTICOLLIS: ICD-10-CM

## 2022-05-26 DIAGNOSIS — I10 HYPERTENSION, UNSPECIFIED TYPE: ICD-10-CM

## 2022-05-26 PROBLEM — Z97.0 PROSTHETIC EYE GLOBE: Status: ACTIVE | Noted: 2018-08-15

## 2022-05-26 LAB
ALBUMIN SERPL-MCNC: 3.5 G/DL (ref 3.4–5)
ALBUMIN UR-MCNC: 50 MG/DL
ALP SERPL-CCNC: 92 U/L (ref 40–150)
ALT SERPL W P-5'-P-CCNC: 24 U/L (ref 0–50)
ANION GAP SERPL CALCULATED.3IONS-SCNC: 5 MMOL/L (ref 3–14)
APPEARANCE UR: CLEAR
AST SERPL W P-5'-P-CCNC: 14 U/L (ref 0–45)
ATRIAL RATE - MUSE: 76 BPM
BASOPHILS # BLD AUTO: 0 10E3/UL (ref 0–0.2)
BASOPHILS NFR BLD AUTO: 0 %
BILIRUB SERPL-MCNC: 0.4 MG/DL (ref 0.2–1.3)
BILIRUB UR QL STRIP: NEGATIVE
BUN SERPL-MCNC: 10 MG/DL (ref 7–30)
CALCIUM SERPL-MCNC: 9.4 MG/DL (ref 8.5–10.1)
CHLORIDE BLD-SCNC: 103 MMOL/L (ref 94–109)
CO2 SERPL-SCNC: 30 MMOL/L (ref 20–32)
COLOR UR AUTO: ABNORMAL
CREAT SERPL-MCNC: 0.86 MG/DL (ref 0.52–1.04)
D DIMER PPP FEU-MCNC: 0.43 UG/ML FEU (ref 0–0.5)
DIASTOLIC BLOOD PRESSURE - MUSE: NORMAL MMHG
EOSINOPHIL # BLD AUTO: 0.3 10E3/UL (ref 0–0.7)
EOSINOPHIL NFR BLD AUTO: 3 %
ERYTHROCYTE [DISTWIDTH] IN BLOOD BY AUTOMATED COUNT: 13.2 % (ref 10–15)
GFR SERPL CREATININE-BSD FRML MDRD: 85 ML/MIN/1.73M2
GLUCOSE BLD-MCNC: 92 MG/DL (ref 70–99)
GLUCOSE UR STRIP-MCNC: NEGATIVE MG/DL
HCG UR QL: NEGATIVE
HCT VFR BLD AUTO: 42.7 % (ref 35–47)
HGB BLD-MCNC: 13.6 G/DL (ref 11.7–15.7)
HGB UR QL STRIP: NEGATIVE
HOLD SPECIMEN: NORMAL
IMM GRANULOCYTES # BLD: 0 10E3/UL
IMM GRANULOCYTES NFR BLD: 0 %
INTERPRETATION ECG - MUSE: NORMAL
KETONES UR STRIP-MCNC: NEGATIVE MG/DL
LEUKOCYTE ESTERASE UR QL STRIP: NEGATIVE
LIPASE SERPL-CCNC: 127 U/L (ref 73–393)
LYMPHOCYTES # BLD AUTO: 2.1 10E3/UL (ref 0.8–5.3)
LYMPHOCYTES NFR BLD AUTO: 25 %
MCH RBC QN AUTO: 26.9 PG (ref 26.5–33)
MCHC RBC AUTO-ENTMCNC: 31.9 G/DL (ref 31.5–36.5)
MCV RBC AUTO: 85 FL (ref 78–100)
MONOCYTES # BLD AUTO: 0.8 10E3/UL (ref 0–1.3)
MONOCYTES NFR BLD AUTO: 10 %
MUCOUS THREADS #/AREA URNS LPF: PRESENT /LPF
NEUTROPHILS # BLD AUTO: 5.3 10E3/UL (ref 1.6–8.3)
NEUTROPHILS NFR BLD AUTO: 62 %
NITRATE UR QL: NEGATIVE
NRBC # BLD AUTO: 0 10E3/UL
NRBC BLD AUTO-RTO: 0 /100
P AXIS - MUSE: 20 DEGREES
PH UR STRIP: 6.5 [PH] (ref 5–7)
PLATELET # BLD AUTO: 255 10E3/UL (ref 150–450)
POTASSIUM BLD-SCNC: 3.7 MMOL/L (ref 3.4–5.3)
PR INTERVAL - MUSE: 140 MS
PROT SERPL-MCNC: 8.4 G/DL (ref 6.8–8.8)
QRS DURATION - MUSE: 82 MS
QT - MUSE: 400 MS
QTC - MUSE: 450 MS
R AXIS - MUSE: -18 DEGREES
RBC # BLD AUTO: 5.05 10E6/UL (ref 3.8–5.2)
RBC URINE: 1 /HPF
SODIUM SERPL-SCNC: 138 MMOL/L (ref 133–144)
SP GR UR STRIP: 1.02 (ref 1–1.03)
SQUAMOUS EPITHELIAL: 3 /HPF
SYSTOLIC BLOOD PRESSURE - MUSE: NORMAL MMHG
T AXIS - MUSE: 8 DEGREES
TROPONIN I SERPL HS-MCNC: 5 NG/L
TROPONIN I SERPL HS-MCNC: 5 NG/L
UROBILINOGEN UR STRIP-MCNC: NORMAL MG/DL
VENTRICULAR RATE- MUSE: 76 BPM
WBC # BLD AUTO: 8.6 10E3/UL (ref 4–11)
WBC URINE: 0 /HPF

## 2022-05-26 PROCEDURE — 70496 CT ANGIOGRAPHY HEAD: CPT | Mod: 26 | Performed by: STUDENT IN AN ORGANIZED HEALTH CARE EDUCATION/TRAINING PROGRAM

## 2022-05-26 PROCEDURE — 93005 ELECTROCARDIOGRAM TRACING: CPT | Performed by: EMERGENCY MEDICINE

## 2022-05-26 PROCEDURE — 99285 EMERGENCY DEPT VISIT HI MDM: CPT | Mod: 25 | Performed by: EMERGENCY MEDICINE

## 2022-05-26 PROCEDURE — 84484 ASSAY OF TROPONIN QUANT: CPT | Performed by: EMERGENCY MEDICINE

## 2022-05-26 PROCEDURE — 72126 CT NECK SPINE W/DYE: CPT | Mod: 26 | Performed by: STUDENT IN AN ORGANIZED HEALTH CARE EDUCATION/TRAINING PROGRAM

## 2022-05-26 PROCEDURE — 72126 CT NECK SPINE W/DYE: CPT

## 2022-05-26 PROCEDURE — 250N000013 HC RX MED GY IP 250 OP 250 PS 637

## 2022-05-26 PROCEDURE — 82040 ASSAY OF SERUM ALBUMIN: CPT | Performed by: EMERGENCY MEDICINE

## 2022-05-26 PROCEDURE — 96374 THER/PROPH/DIAG INJ IV PUSH: CPT | Mod: 59 | Performed by: EMERGENCY MEDICINE

## 2022-05-26 PROCEDURE — 81025 URINE PREGNANCY TEST: CPT | Performed by: EMERGENCY MEDICINE

## 2022-05-26 PROCEDURE — 250N000011 HC RX IP 250 OP 636: Performed by: EMERGENCY MEDICINE

## 2022-05-26 PROCEDURE — 85379 FIBRIN DEGRADATION QUANT: CPT | Performed by: EMERGENCY MEDICINE

## 2022-05-26 PROCEDURE — 70450 CT HEAD/BRAIN W/O DYE: CPT | Mod: XU

## 2022-05-26 PROCEDURE — 36415 COLL VENOUS BLD VENIPUNCTURE: CPT | Performed by: EMERGENCY MEDICINE

## 2022-05-26 PROCEDURE — 71046 X-RAY EXAM CHEST 2 VIEWS: CPT

## 2022-05-26 PROCEDURE — 80053 COMPREHEN METABOLIC PANEL: CPT | Performed by: EMERGENCY MEDICINE

## 2022-05-26 PROCEDURE — 70496 CT ANGIOGRAPHY HEAD: CPT

## 2022-05-26 PROCEDURE — 70498 CT ANGIOGRAPHY NECK: CPT | Mod: 26 | Performed by: STUDENT IN AN ORGANIZED HEALTH CARE EDUCATION/TRAINING PROGRAM

## 2022-05-26 PROCEDURE — 71046 X-RAY EXAM CHEST 2 VIEWS: CPT | Mod: 26 | Performed by: RADIOLOGY

## 2022-05-26 PROCEDURE — 83690 ASSAY OF LIPASE: CPT | Performed by: EMERGENCY MEDICINE

## 2022-05-26 PROCEDURE — 93010 ELECTROCARDIOGRAM REPORT: CPT | Performed by: EMERGENCY MEDICINE

## 2022-05-26 PROCEDURE — 85025 COMPLETE CBC W/AUTO DIFF WBC: CPT | Performed by: EMERGENCY MEDICINE

## 2022-05-26 PROCEDURE — 70450 CT HEAD/BRAIN W/O DYE: CPT | Mod: 26 | Performed by: RADIOLOGY

## 2022-05-26 PROCEDURE — 250N000013 HC RX MED GY IP 250 OP 250 PS 637: Performed by: EMERGENCY MEDICINE

## 2022-05-26 PROCEDURE — 81001 URINALYSIS AUTO W/SCOPE: CPT | Performed by: EMERGENCY MEDICINE

## 2022-05-26 RX ORDER — NITROGLYCERIN 0.3 MG/1
0.3 TABLET SUBLINGUAL EVERY 5 MIN PRN
Status: DISCONTINUED | OUTPATIENT
Start: 2022-05-26 | End: 2022-05-26

## 2022-05-26 RX ORDER — IOPAMIDOL 755 MG/ML
75 INJECTION, SOLUTION INTRAVASCULAR ONCE
Status: COMPLETED | OUTPATIENT
Start: 2022-05-26 | End: 2022-05-26

## 2022-05-26 RX ORDER — METHOCARBAMOL 500 MG/1
1000 TABLET, FILM COATED ORAL ONCE
Status: COMPLETED | OUTPATIENT
Start: 2022-05-26 | End: 2022-05-26

## 2022-05-26 RX ORDER — NITROGLYCERIN 0.4 MG/1
TABLET SUBLINGUAL
Status: COMPLETED
Start: 2022-05-26 | End: 2022-05-26

## 2022-05-26 RX ORDER — METOPROLOL TARTRATE 25 MG/1
25 TABLET, FILM COATED ORAL ONCE
Status: DISCONTINUED | OUTPATIENT
Start: 2022-05-26 | End: 2022-05-26

## 2022-05-26 RX ORDER — HYDROMORPHONE HYDROCHLORIDE 1 MG/ML
0.5 INJECTION, SOLUTION INTRAMUSCULAR; INTRAVENOUS; SUBCUTANEOUS
Status: DISCONTINUED | OUTPATIENT
Start: 2022-05-26 | End: 2022-05-26 | Stop reason: HOSPADM

## 2022-05-26 RX ORDER — LOSARTAN POTASSIUM AND HYDROCHLOROTHIAZIDE 12.5; 1 MG/1; MG/1
1 TABLET ORAL DAILY
Qty: 30 TABLET | Refills: 0 | Status: SHIPPED | OUTPATIENT
Start: 2022-05-26 | End: 2022-07-10

## 2022-05-26 RX ORDER — METOPROLOL SUCCINATE 25 MG/1
25 TABLET, EXTENDED RELEASE ORAL ONCE
Status: COMPLETED | OUTPATIENT
Start: 2022-05-26 | End: 2022-05-26

## 2022-05-26 RX ORDER — METOPROLOL SUCCINATE 25 MG/1
25 TABLET, EXTENDED RELEASE ORAL
COMMUNITY
Start: 2019-11-13 | End: 2022-05-26

## 2022-05-26 RX ORDER — METHOCARBAMOL 750 MG/1
1500 TABLET, FILM COATED ORAL 4 TIMES DAILY PRN
Qty: 24 TABLET | Refills: 0 | Status: SHIPPED | OUTPATIENT
Start: 2022-05-26 | End: 2022-05-29

## 2022-05-26 RX ORDER — DIAZEPAM 5 MG
10 TABLET ORAL ONCE
Status: COMPLETED | OUTPATIENT
Start: 2022-05-26 | End: 2022-05-26

## 2022-05-26 RX ORDER — NITROGLYCERIN 0.4 MG/1
0.4 TABLET SUBLINGUAL EVERY 5 MIN PRN
Status: DISCONTINUED | OUTPATIENT
Start: 2022-05-26 | End: 2022-05-26 | Stop reason: HOSPADM

## 2022-05-26 RX ORDER — METOPROLOL SUCCINATE 25 MG/1
25 TABLET, EXTENDED RELEASE ORAL DAILY
Qty: 30 TABLET | Refills: 0 | Status: SHIPPED | OUTPATIENT
Start: 2022-05-26 | End: 2023-01-25

## 2022-05-26 RX ORDER — ONDANSETRON 2 MG/ML
4 INJECTION INTRAMUSCULAR; INTRAVENOUS EVERY 30 MIN PRN
Status: DISCONTINUED | OUTPATIENT
Start: 2022-05-26 | End: 2022-05-26 | Stop reason: HOSPADM

## 2022-05-26 RX ADMIN — METOPROLOL SUCCINATE 25 MG: 25 TABLET, EXTENDED RELEASE ORAL at 10:01

## 2022-05-26 RX ADMIN — NITROGLYCERIN 0.4 MG: 0.4 TABLET SUBLINGUAL at 10:01

## 2022-05-26 RX ADMIN — HYDROMORPHONE HYDROCHLORIDE 0.5 MG: 1 INJECTION, SOLUTION INTRAMUSCULAR; INTRAVENOUS; SUBCUTANEOUS at 09:49

## 2022-05-26 RX ADMIN — METHOCARBAMOL 1000 MG: 500 TABLET ORAL at 13:41

## 2022-05-26 RX ADMIN — DIAZEPAM 10 MG: 5 TABLET ORAL at 10:01

## 2022-05-26 RX ADMIN — LOSARTAN POTASSIUM: 50 TABLET, FILM COATED ORAL at 13:42

## 2022-05-26 RX ADMIN — IOPAMIDOL 75 ML: 755 INJECTION, SOLUTION INTRAVENOUS at 11:16

## 2022-05-26 ASSESSMENT — ENCOUNTER SYMPTOMS
FEVER: 0
COUGH: 0
NECK PAIN: 1
HEADACHES: 1
MYALGIAS: 1
CHILLS: 0
SHORTNESS OF BREATH: 0
WEAKNESS: 1
NUMBNESS: 1

## 2022-05-26 NOTE — DISCHARGE INSTRUCTIONS
Follow up with your primary doctor to address high blood pressure.    Take Methocarbamol for muscle spasm pain.     Take 600 mg ibuprofen every 8 hours, for pain and inflammation.  Take this on schedule, for approximately 1 week or until resolution of symptoms.    Take this medication with food to prevent stomach upset.  If you taking a chronic antacid medication, make sure to take this while you are taking this medication.    Rest, avoid repetitive motion, and lifting over 5 pounds with the effected extremity.  May use ice or heating pad to help with the pain.    Follow-up with her primary care doctor in 1-2 weeks if your symptoms have not resolved. You may benefit from a referral to physical therapy.     Return to the emergency department if you develop worsening pain, weakness, numbness or tingling, bowel or bladder changes.

## 2022-05-26 NOTE — ED TRIAGE NOTES
41 yr old female ambulatory to triage presenting with right sided head and neck pain radiating down her right arm x 4 days, worsening. Feeling right arm weakness as well x 1 week. Feeling right shoulder swelling. Experienced some intermittent sharp chest pain yesterday but none today. Diarrhea x 1 week. Nausea, no vomiting. Subjective fevers. Fully vaccinated for COVID, no booster. COVID in January. No known history of blood clots. No SOB.      Triage Assessment     Row Name 05/26/22 0736       Triage Assessment (Adult)    Airway WDL WDL       Respiratory WDL    Respiratory WDL WDL       Skin Circulation/Temperature WDL    Skin Circulation/Temperature WDL WDL       Cardiac WDL    Cardiac WDL X;all       Chest Pain Assessment    Associated Signs/Symptoms hypertension       Peripheral/Neurovascular WDL    Peripheral Neurovascular WDL WDL       Cognitive/Neuro/Behavioral WDL    Cognitive/Neuro/Behavioral WDL WDL
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
## 2022-07-10 ENCOUNTER — APPOINTMENT (OUTPATIENT)
Dept: CT IMAGING | Facility: CLINIC | Age: 42
End: 2022-07-10
Attending: NURSE PRACTITIONER
Payer: COMMERCIAL

## 2022-07-10 ENCOUNTER — HOSPITAL ENCOUNTER (EMERGENCY)
Facility: CLINIC | Age: 42
Discharge: HOME | End: 2022-07-10
Attending: EMERGENCY MEDICINE | Admitting: EMERGENCY MEDICINE
Payer: COMMERCIAL

## 2022-07-10 ENCOUNTER — APPOINTMENT (OUTPATIENT)
Dept: GENERAL RADIOLOGY | Facility: CLINIC | Age: 42
End: 2022-07-10
Attending: EMERGENCY MEDICINE
Payer: COMMERCIAL

## 2022-07-10 VITALS
RESPIRATION RATE: 15 BRPM | HEART RATE: 74 BPM | OXYGEN SATURATION: 99 % | SYSTOLIC BLOOD PRESSURE: 161 MMHG | DIASTOLIC BLOOD PRESSURE: 112 MMHG

## 2022-07-10 DIAGNOSIS — K08.89 PAIN, DENTAL: ICD-10-CM

## 2022-07-10 DIAGNOSIS — I10 HYPERTENSION, UNSPECIFIED TYPE: ICD-10-CM

## 2022-07-10 LAB
ALBUMIN SERPL BCG-MCNC: 4 G/DL (ref 3.5–5.2)
ALBUMIN UR-MCNC: NEGATIVE MG/DL
ALP SERPL-CCNC: 82 U/L (ref 35–104)
ALT SERPL W P-5'-P-CCNC: 18 U/L (ref 10–35)
ANION GAP SERPL CALCULATED.3IONS-SCNC: 10 MMOL/L (ref 7–15)
APPEARANCE UR: CLEAR
AST SERPL W P-5'-P-CCNC: 19 U/L (ref 10–35)
BASOPHILS # BLD AUTO: 0 10E3/UL (ref 0–0.2)
BASOPHILS NFR BLD AUTO: 0 %
BILIRUB SERPL-MCNC: 0.2 MG/DL
BILIRUB UR QL STRIP: NEGATIVE
BUN SERPL-MCNC: 9.2 MG/DL (ref 6–20)
CALCIUM SERPL-MCNC: 9.7 MG/DL (ref 8.6–10)
CHLORIDE SERPL-SCNC: 102 MMOL/L (ref 98–107)
COLOR UR AUTO: ABNORMAL
CREAT BLD-MCNC: 0.9 MG/DL (ref 0.5–1)
CREAT SERPL-MCNC: 0.93 MG/DL (ref 0.51–0.95)
DEPRECATED HCO3 PLAS-SCNC: 24 MMOL/L (ref 22–29)
EOSINOPHIL # BLD AUTO: 0.5 10E3/UL (ref 0–0.7)
EOSINOPHIL NFR BLD AUTO: 5 %
ERYTHROCYTE [DISTWIDTH] IN BLOOD BY AUTOMATED COUNT: 13.2 % (ref 10–15)
GFR SERPL CREATININE-BSD FRML MDRD: 78 ML/MIN/1.73M2
GFR SERPL CREATININE-BSD FRML MDRD: >60 ML/MIN/1.73M2
GLUCOSE SERPL-MCNC: 117 MG/DL (ref 70–99)
GLUCOSE UR STRIP-MCNC: NEGATIVE MG/DL
HCT VFR BLD AUTO: 40.5 % (ref 35–47)
HGB BLD-MCNC: 12.9 G/DL (ref 11.7–15.7)
HGB UR QL STRIP: NEGATIVE
HOLD SPECIMEN: NORMAL
IMM GRANULOCYTES # BLD: 0 10E3/UL
IMM GRANULOCYTES NFR BLD: 0 %
KETONES UR STRIP-MCNC: NEGATIVE MG/DL
LEUKOCYTE ESTERASE UR QL STRIP: NEGATIVE
LYMPHOCYTES # BLD AUTO: 2.8 10E3/UL (ref 0.8–5.3)
LYMPHOCYTES NFR BLD AUTO: 30 %
MCH RBC QN AUTO: 27 PG (ref 26.5–33)
MCHC RBC AUTO-ENTMCNC: 31.9 G/DL (ref 31.5–36.5)
MCV RBC AUTO: 85 FL (ref 78–100)
MONOCYTES # BLD AUTO: 0.8 10E3/UL (ref 0–1.3)
MONOCYTES NFR BLD AUTO: 8 %
MUCOUS THREADS #/AREA URNS LPF: PRESENT /LPF
NEUTROPHILS # BLD AUTO: 5.3 10E3/UL (ref 1.6–8.3)
NEUTROPHILS NFR BLD AUTO: 57 %
NITRATE UR QL: NEGATIVE
NRBC # BLD AUTO: 0 10E3/UL
NRBC BLD AUTO-RTO: 0 /100
PH UR STRIP: 7 [PH] (ref 5–7)
PLATELET # BLD AUTO: 265 10E3/UL (ref 150–450)
POTASSIUM SERPL-SCNC: 3.6 MMOL/L (ref 3.4–5.3)
PROT SERPL-MCNC: 8 G/DL (ref 6.4–8.3)
RBC # BLD AUTO: 4.77 10E6/UL (ref 3.8–5.2)
RBC URINE: <1 /HPF
SALICYLATES SERPL-MCNC: 0.8 MG/DL
SODIUM SERPL-SCNC: 136 MMOL/L (ref 136–145)
SP GR UR STRIP: 1.01 (ref 1–1.03)
SQUAMOUS EPITHELIAL: 4 /HPF
TROPONIN T SERPL HS-MCNC: <6 NG/L
UROBILINOGEN UR STRIP-MCNC: NORMAL MG/DL
WBC # BLD AUTO: 9.4 10E3/UL (ref 4–11)
WBC URINE: 1 /HPF

## 2022-07-10 PROCEDURE — 70491 CT SOFT TISSUE NECK W/DYE: CPT | Mod: 26 | Performed by: RADIOLOGY

## 2022-07-10 PROCEDURE — 96376 TX/PRO/DX INJ SAME DRUG ADON: CPT | Mod: 59

## 2022-07-10 PROCEDURE — 82565 ASSAY OF CREATININE: CPT

## 2022-07-10 PROCEDURE — 81001 URINALYSIS AUTO W/SCOPE: CPT | Performed by: NURSE PRACTITIONER

## 2022-07-10 PROCEDURE — 70491 CT SOFT TISSUE NECK W/DYE: CPT

## 2022-07-10 PROCEDURE — 85025 COMPLETE CBC W/AUTO DIFF WBC: CPT | Performed by: NURSE PRACTITIONER

## 2022-07-10 PROCEDURE — 71046 X-RAY EXAM CHEST 2 VIEWS: CPT | Mod: 26 | Performed by: RADIOLOGY

## 2022-07-10 PROCEDURE — 70450 CT HEAD/BRAIN W/O DYE: CPT | Mod: 26 | Performed by: RADIOLOGY

## 2022-07-10 PROCEDURE — 71046 X-RAY EXAM CHEST 2 VIEWS: CPT

## 2022-07-10 PROCEDURE — 80053 COMPREHEN METABOLIC PANEL: CPT | Performed by: NURSE PRACTITIONER

## 2022-07-10 PROCEDURE — 80179 DRUG ASSAY SALICYLATE: CPT | Performed by: EMERGENCY MEDICINE

## 2022-07-10 PROCEDURE — 99285 EMERGENCY DEPT VISIT HI MDM: CPT | Mod: 25

## 2022-07-10 PROCEDURE — 93005 ELECTROCARDIOGRAM TRACING: CPT

## 2022-07-10 PROCEDURE — 36415 COLL VENOUS BLD VENIPUNCTURE: CPT | Performed by: NURSE PRACTITIONER

## 2022-07-10 PROCEDURE — 96374 THER/PROPH/DIAG INJ IV PUSH: CPT | Mod: 59

## 2022-07-10 PROCEDURE — 99285 EMERGENCY DEPT VISIT HI MDM: CPT | Mod: 25 | Performed by: EMERGENCY MEDICINE

## 2022-07-10 PROCEDURE — 250N000013 HC RX MED GY IP 250 OP 250 PS 637: Performed by: EMERGENCY MEDICINE

## 2022-07-10 PROCEDURE — 250N000011 HC RX IP 250 OP 636: Performed by: EMERGENCY MEDICINE

## 2022-07-10 PROCEDURE — 93010 ELECTROCARDIOGRAM REPORT: CPT | Performed by: EMERGENCY MEDICINE

## 2022-07-10 PROCEDURE — 84484 ASSAY OF TROPONIN QUANT: CPT | Performed by: EMERGENCY MEDICINE

## 2022-07-10 PROCEDURE — 96375 TX/PRO/DX INJ NEW DRUG ADDON: CPT

## 2022-07-10 PROCEDURE — 258N000003 HC RX IP 258 OP 636: Performed by: EMERGENCY MEDICINE

## 2022-07-10 PROCEDURE — 96361 HYDRATE IV INFUSION ADD-ON: CPT

## 2022-07-10 PROCEDURE — 70450 CT HEAD/BRAIN W/O DYE: CPT

## 2022-07-10 RX ORDER — LOSARTAN POTASSIUM AND HYDROCHLOROTHIAZIDE 12.5; 5 MG/1; MG/1
1 TABLET ORAL DAILY
Qty: 30 TABLET | Refills: 0 | Status: SHIPPED | OUTPATIENT
Start: 2022-07-10 | End: 2023-01-25

## 2022-07-10 RX ORDER — LABETALOL HYDROCHLORIDE 5 MG/ML
10 INJECTION, SOLUTION INTRAVENOUS ONCE
Status: COMPLETED | OUTPATIENT
Start: 2022-07-10 | End: 2022-07-10

## 2022-07-10 RX ORDER — LOSARTAN POTASSIUM 50 MG/1
50 TABLET ORAL DAILY
Status: DISCONTINUED | OUTPATIENT
Start: 2022-07-10 | End: 2022-07-10 | Stop reason: HOSPADM

## 2022-07-10 RX ORDER — HYDROCHLOROTHIAZIDE 12.5 MG/1
12.5 TABLET ORAL DAILY
Status: DISCONTINUED | OUTPATIENT
Start: 2022-07-10 | End: 2022-07-10 | Stop reason: HOSPADM

## 2022-07-10 RX ORDER — HYDRALAZINE HYDROCHLORIDE 20 MG/ML
10 INJECTION INTRAMUSCULAR; INTRAVENOUS ONCE
Status: DISCONTINUED | OUTPATIENT
Start: 2022-07-10 | End: 2022-07-10

## 2022-07-10 RX ORDER — IOPAMIDOL 755 MG/ML
100 INJECTION, SOLUTION INTRAVASCULAR ONCE
Status: COMPLETED | OUTPATIENT
Start: 2022-07-10 | End: 2022-07-10

## 2022-07-10 RX ORDER — CHOLECALCIFEROL (VITAMIN D3) 50 MCG
1 TABLET ORAL DAILY
COMMUNITY
Start: 2022-07-03

## 2022-07-10 RX ORDER — SODIUM CHLORIDE 9 MG/ML
INJECTION, SOLUTION INTRAVENOUS CONTINUOUS
Status: DISCONTINUED | OUTPATIENT
Start: 2022-07-10 | End: 2022-07-10 | Stop reason: HOSPADM

## 2022-07-10 RX ORDER — PANTOPRAZOLE SODIUM 40 MG/1
1 TABLET, DELAYED RELEASE ORAL DAILY
COMMUNITY
Start: 2022-07-08

## 2022-07-10 RX ORDER — OXYCODONE AND ACETAMINOPHEN 5; 325 MG/1; MG/1
1 TABLET ORAL EVERY 6 HOURS PRN
Qty: 12 TABLET | Refills: 0 | Status: SHIPPED | OUTPATIENT
Start: 2022-07-10 | End: 2022-07-13

## 2022-07-10 RX ORDER — MORPHINE SULFATE 4 MG/ML
4 INJECTION, SOLUTION INTRAMUSCULAR; INTRAVENOUS
Status: COMPLETED | OUTPATIENT
Start: 2022-07-10 | End: 2022-07-10

## 2022-07-10 RX ORDER — LOSARTAN POTASSIUM AND HYDROCHLOROTHIAZIDE 12.5; 5 MG/1; MG/1
1 TABLET ORAL DAILY
COMMUNITY
Start: 2022-07-08

## 2022-07-10 RX ORDER — PENICILLIN V POTASSIUM 500 MG/1
500 TABLET, FILM COATED ORAL 4 TIMES DAILY
Qty: 40 TABLET | Refills: 0 | Status: SHIPPED | OUTPATIENT
Start: 2022-07-10 | End: 2022-07-20

## 2022-07-10 RX ADMIN — SODIUM CHLORIDE 1000 ML: 9 INJECTION, SOLUTION INTRAVENOUS at 15:01

## 2022-07-10 RX ADMIN — MORPHINE SULFATE 4 MG: 4 INJECTION INTRAVENOUS at 15:00

## 2022-07-10 RX ADMIN — LABETALOL HYDROCHLORIDE 10 MG: 5 INJECTION, SOLUTION INTRAVENOUS at 15:00

## 2022-07-10 RX ADMIN — HYDROCHLOROTHIAZIDE 12.5 MG: 12.5 TABLET ORAL at 16:38

## 2022-07-10 RX ADMIN — LABETALOL HYDROCHLORIDE 10 MG: 5 INJECTION, SOLUTION INTRAVENOUS at 16:16

## 2022-07-10 RX ADMIN — LOSARTAN POTASSIUM 50 MG: 50 TABLET, FILM COATED ORAL at 16:38

## 2022-07-10 RX ADMIN — IOPAMIDOL 100 ML: 755 INJECTION, SOLUTION INTRAVENOUS at 15:07

## 2022-07-10 ASSESSMENT — ENCOUNTER SYMPTOMS
BACK PAIN: 0
SORE THROAT: 0
SHORTNESS OF BREATH: 0
COUGH: 0
FEVER: 0
ABDOMINAL PAIN: 0
HEADACHES: 1
CHILLS: 0
FLANK PAIN: 0

## 2022-07-10 NOTE — DISCHARGE INSTRUCTIONS
Please make an appointment to follow up with [facility] (phone: [phone]), [facility] (phone: [phone]), and [facility] (phone: [phone]) as soon as possible.    Please take your blood pressure medication and follow-up with your primary care physician.

## 2022-07-10 NOTE — ED PROVIDER NOTES
ED Provider Note  [facility]      History     Chief Complaint   Patient presents with     Dental Pain     HPI  Elina Hood is a 41 year old female who presents for left tooth pain.  She has had a bad tooth for several months.  She has not yet had this extracted secondary to her uncontrolled hypertension.  She came in this evening due to uncontrolled pain.  She has been doing ibuprofen and aspirin for her symptoms.  She states she has been needing increased dosing of this because of her uncontrolled pain.  She states she is taking her blood pressure medication and only missed 1 day.  She reports that this morning she had chest pain but this went away after taking aspirin.  She does not currently have the chest pain.  Denies any shortness of breath.  She has a headache but believes this is secondary to her tooth pain.  She denies any visual changes but reports intermittent blurry vision for several months.  She denies any neurologic symptoms at this time.    Past Medical History  Past Medical History:   Diagnosis Date     Allergic state      GERD (gastroesophageal reflux disease)      Hypertension      Past Surgical History:   Procedure Laterality Date     RELEASE TRIGGER FINGER BILATERAL Bilateral 10/3/2019    Procedure: Bilateral Thumb Trigger Finger Release;  Surgeon: Nelson Medrano MD;  Location:  OR     losartan-hydrochlorothiazide (HYZAAR) 50-12.5 MG tablet  oxyCODONE-acetaminophen (PERCOCET) 5-325 MG tablet  penicillin V (VEETID) 500 MG tablet  albuterol (ACCUNEB) 1.25 MG/3ML nebulizer solution  aspirin 325 MG tablet  carvedilol (COREG) 25 MG tablet  cetirizine (ZYRTEC) 10 MG tablet  HYDROcodone-acetaminophen (NORCO) 5-325 MG tablet  ibuprofen (ADVIL/MOTRIN) 200 MG tablet  losartan-hydrochlorothiazide (HYZAAR) 50-12.5 MG tablet  metoprolol succinate ER (TOPROL XL) 25 MG 24 hr tablet  omeprazole (PRILOSEC) 20 MG CR capsule  oxyCODONE-acetaminophen (PERCOCET) 5-325 MG  tablet  pantoprazole (PROTONIX) 40 MG EC tablet  VITAMIN D3 50 MCG (2000 UT) tablet      Allergies   Allergen Reactions     Nuts Other (See Comments)     glands in neck swell, pain in ears (peanut butter)     Peanut Oil Swelling     Peanut Butter Flavor Swelling     Mild, still eats it though     Family History  No family history on file.  Social History   Social History     Tobacco Use     Smoking status: Never Smoker     Smokeless tobacco: Never Used   Substance Use Topics     Alcohol use: Yes     Comment: rarely      Drug use: No      Past medical history, past surgical history, medications, allergies, family history, and social history were reviewed with the patient. No additional pertinent items.       Review of Systems  A complete review of systems was performed with pertinent positives and negatives noted in the HPI, and all other systems negative.    Physical Exam   BP: (!) 205/143  Pulse: 100  Resp: 16  SpO2: 100 %  Physical Exam  Constitutional:       General: She is not in acute distress.     Appearance: Normal appearance.   HENT:      Head: Normocephalic and atraumatic.      Nose: Nose normal.      Mouth/Throat:      Mouth: Mucous membranes are moist.      Comments: Left lower second last molar with dental defect no purulence no palpable abscess    Palpable small nodule submandibularly, suspect lymph node  Eyes:      Comments: Right eye prosthesis    Left eye has extraocular motions intact pupils reactive, vision intact in 4 quadrants   Cardiovascular:      Rate and Rhythm: Normal rate and regular rhythm.      Pulses: Normal pulses.   Pulmonary:      Effort: Pulmonary effort is normal.      Breath sounds: Normal breath sounds. No wheezing, rhonchi or rales.   Abdominal:      General: Abdomen is flat.      Palpations: Abdomen is soft.      Tenderness: There is no abdominal tenderness. There is no guarding or rebound.   Musculoskeletal:         General: No tenderness. Normal range of motion.      Cervical  back: Normal range of motion.   Skin:     General: Skin is warm.   Neurological:      General: No focal deficit present.      Mental Status: She is alert and oriented to person, place, and time. Mental status is at baseline.      Cranial Nerves: No cranial nerve deficit.      Sensory: No sensory deficit.      Motor: No weakness.      Coordination: Coordination normal.         ED Course      Procedures            EKG Interpretation:      Interpreted by Vincent Meyers MD  Time reviewed: 1437  Symptoms at time of EKG: None   Rhythm: normal sinus   Rate: 87  Axis: Normal  Ectopy: none  Conduction: normal  ST Segments/ T Waves: No acute ischemic changes  Q Waves: none  Comparison to prior: No change 5/26/22    Clinical Impression: no acute changes     Results for orders placed or performed during the hospital encounter of 07/10/22   Head CT w/o contrast     Status: None    Narrative    CT HEAD W/O CONTRAST 7/10/2022 3:25 PM    History: Hypertensive, headache.     Comparison: 5/26/2022    Technique: Using multidetector thin collimation helical acquisition  technique, axial, coronal and sagittal CT images from the skull base  to the vertex were obtained without intravenous contrast.   (topogram) image(s) also obtained and reviewed.    Findings: There is no intracranial hemorrhage, mass effect, or midline  shift. Gray/white matter differentiation in both cerebral hemispheres  is preserved. Ventricles are proportionate to the cerebral sulci. The  basal cisterns are clear.    Anterior subluxation of the temporomandibular joints bilaterally. The  bony calvaria and the bones of the skull base are normal. The  visualized portions of the paranasal sinuses and mastoid air cells are  clear. Right ocular prosthesis.      Impression    Impression:  No acute intracranial pathology.     I have personally reviewed the examination and initial interpretation  and I agree with the findings.    JACKY ELLIOTT MD         SYSTEM ID:   [id number]   Soft tissue neck CT w contrast     Status: None    Narrative    CT SOFT TISSUE NECK W CONTRAST 7/10/2022 3:32 PM    History:  Concern for left sided neck abscess with known dental issue.      Comparison:  None     Technique: Following intravenous administration of nonionic iodinated  contrast medium, thin section helical CT images were obtained from the  skull base down to the level of the aortic arch.  Axial, coronal and  sagittal reformations were performed with 2-3 mm slice thickness  reconstruction. Images were reviewed in soft tissue, lung and bone  windows.    Contrast: Isovue 370    Findings:   Evaluation of the mucosal space demonstrates no evident abnormality in  the nasopharynx, oropharynx, hypopharynx or the glottis. The tongue  base appears normal. The major salivary glands appear unremarkable.  The thyroid gland appears normal.    No abnormally enlarged lymphadenopathy. The fascial spaces in the neck  are intact bilaterally. The major vascular structures in the neck  appear unremarkable. There is no abscess. There is a cavity within the  left mandibular first molar tooth.    Evaluation of the osseous structures demonstrate no worrisome lytic or  sclerotic lesion. No overt spinal canal or neuroforaminal stenosis.  The visualized paranasal sinuses are clear. The mastoid air cells are  clear.     The visualized lung apices are clear.      Impression    Impression:  No evidence of abscess.    I have personally reviewed the examination and initial interpretation  and I agree with the findings.    JACKY ELLIOTT MD         SYSTEM ID:  [id number]   XR Chest 2 Views     Status: None    Narrative    XR CHEST 2 VW  7/10/2022 3:27 PM      HISTORY: chest pain    COMPARISON: 5/26/2022    FINDINGS:   PA and lateral views of the chest. Trachea is midline. Similar  cardiomegaly. No pneumothorax or pleural effusion. Bibasilar streaky  atelectasis. No focal consolidation. No acute osseous abnormality.       Impression    IMPRESSION:   No acute radiographic findings.    I have personally reviewed the examination and initial interpretation  and I agree with the findings.    AISHA FLORES MD         SYSTEM ID:  [id number]   Comprehensive metabolic panel     Status: Abnormal   Result Value Ref Range    Sodium 136 136 - 145 mmol/L    Potassium 3.6 3.4 - 5.3 mmol/L    Creatinine 0.93 0.51 - 0.95 mg/dL    Urea Nitrogen 9.2 6.0 - 20.0 mg/dL    Chloride 102 98 - 107 mmol/L    Carbon Dioxide (CO2) 24 22 - 29 mmol/L    Anion Gap 10 7 - 15 mmol/L    Glucose 117 (H) 70 - 99 mg/dL    Calcium 9.7 8.6 - 10.0 mg/dL    Protein Total 8.0 6.4 - 8.3 g/dL    Albumin 4.0 3.5 - 5.2 g/dL    Bilirubin Total 0.2 <=1.2 mg/dL    Alkaline Phosphatase 82 35 - 104 U/L    AST 19 10 - 35 U/L    ALT 18 10 - 35 U/L    GFR Estimate 78 >60 mL/min/1.73m2   UA with Microscopic reflex to Culture     Status: Abnormal    Specimen: Urine, Midstream   Result Value Ref Range    Color Urine Straw Colorless, Straw, Light Yellow, Yellow    Appearance Urine Clear Clear    Glucose Urine Negative Negative mg/dL    Bilirubin Urine Negative Negative    Ketones Urine Negative Negative mg/dL    Specific Gravity Urine 1.010 1.003 - 1.035    Blood Urine Negative Negative    pH Urine 7.0 5.0 - 7.0    Protein Albumin Urine Negative Negative mg/dL    Urobilinogen Urine Normal Normal, 2.0 mg/dL    Nitrite Urine Negative Negative    Leukocyte Esterase Urine Negative Negative    Mucus Urine Present (A) None Seen /LPF    RBC Urine <1 <=2 /HPF    WBC Urine 1 <=5 /HPF    Squamous Epithelials Urine 4 (H) <=1 /HPF    Narrative    Urine Culture not indicated   [place] Draw     Status: None (In process)    Narrative    The following orders were created for panel order [place] Draw.  Procedure                               Abnormality         Status                     ---------                               -----------         ------                     Extra Blue Top Tube[[id number]]                                                          Extra Red Top Tube[[id number]]                               Final result               Extra Green Top (Lithium...[[id number]]                      Final result               Extra Purple Top Tube[[id number]]                            Final result                 Please view results for these tests on the individual orders.   CBC with platelets and differential     Status: None   Result Value Ref Range    WBC Count 9.4 4.0 - 11.0 10e3/uL    RBC Count 4.77 3.80 - 5.20 10e6/uL    Hemoglobin 12.9 11.7 - 15.7 g/dL    Hematocrit 40.5 35.0 - 47.0 %    MCV 85 78 - 100 fL    MCH 27.0 26.5 - 33.0 pg    MCHC 31.9 31.5 - 36.5 g/dL    RDW 13.2 10.0 - 15.0 %    Platelet Count 265 150 - 450 10e3/uL    % Neutrophils 57 %    % Lymphocytes 30 %    % Monocytes 8 %    % Eosinophils 5 %    % Basophils 0 %    % Immature Granulocytes 0 %    NRBCs per 100 WBC 0 <1 /100    Absolute Neutrophils 5.3 1.6 - 8.3 10e3/uL    Absolute Lymphocytes 2.8 0.8 - 5.3 10e3/uL    Absolute Monocytes 0.8 0.0 - 1.3 10e3/uL    Absolute Eosinophils 0.5 0.0 - 0.7 10e3/uL    Absolute Basophils 0.0 0.0 - 0.2 10e3/uL    Absolute Immature Granulocytes 0.0 <=0.4 10e3/uL    Absolute NRBCs 0.0 10e3/uL   Extra Red Top Tube     Status: None   Result Value Ref Range    Hold Specimen JIC    Extra Green Top (Lithium Heparin) Tube     Status: None   Result Value Ref Range    Hold Specimen JIC    Extra Purple Top Tube     Status: None   Result Value Ref Range    Hold Specimen JIC    [place] Draw     Status: None    Narrative    The following orders were created for panel order [place] Draw.  Procedure                               Abnormality         Status                     ---------                               -----------         ------                     Extra Blue Top Tube[[id number]]                              Final result                 Please view results for these tests on the individual orders.   Extra Blue  Top Tube     Status: None   Result Value Ref Range    Hold Specimen JIC    Creatinine POCT     Status: Normal   Result Value Ref Range    Creatinine POCT 0.9 0.5 - 1.0 mg/dL    GFR, ESTIMATED POCT >60 >60 mL/min/1.73m2   Troponin T, High Sensitivity     Status: Normal   Result Value Ref Range    Troponin T, High Sensitivity <6 <=14 ng/L   Salicylate level     Status: Normal   Result Value Ref Range    Salicylate 0.8   mg/dL   EKG 12-lead, tracing only     Status: None (Preliminary result)   Result Value Ref Range    Systolic Blood Pressure  mmHg    Diastolic Blood Pressure  mmHg    Ventricular Rate 87 BPM    Atrial Rate 87 BPM    FL Interval 130 ms    QRS Duration 86 ms     ms    QTc 440 ms    P Axis 73 degrees    R AXIS -3 degrees    T Axis 11 degrees    Interpretation ECG       Sinus rhythm  Minimal voltage criteria for LVH, may be normal variant  Borderline ECG     CBC with platelets differential     Status: None    Narrative    The following orders were created for panel order CBC with platelets differential.  Procedure                               Abnormality         Status                     ---------                               -----------         ------                     CBC with platelets and d...[[id number]]                      Final result                 Please view results for these tests on the individual orders.     Medications   0.9% sodium chloride BOLUS (0 mLs Intravenous Stopped 7/10/22 1616)     Followed by   sodium chloride 0.9% infusion (has no administration in time range)   losartan (COZAAR) tablet 50 mg (50 mg Oral Given 7/10/22 1638)   hydrochlorothiazide (HYDRODIURIL) tablet 12.5 mg (12.5 mg Oral Given 7/10/22 1638)   morphine (PF) injection 4 mg (4 mg Intravenous Given 7/10/22 1500)   labetalol (NORMODYNE/TRANDATE) injection 10 mg (10 mg Intravenous Given 7/10/22 1500)   iopamidol (ISOVUE-370) solution 100 mL (100 mLs Intravenous Given 7/10/22 1507)   sodium chloride (PF) 0.9%  PF flush 60 mL (60 mLs Intravenous Given 7/10/22 1507)   labetalol (NORMODYNE/TRANDATE) injection 10 mg (10 mg Intravenous Given 7/10/22 1616)        Assessments & Plan (with Medical Decision Making)   Patient presents for dental pain for several months.  It is causing her to have a headache.  She states that she has not been able to get this removed due to her hypertension which is poorly controlled.  She states she has been taking her metoprolol but is out of her Hyzaar for several days.  She was also recently added metoprolol but did not fill this prescription.  On chart review she has been to the ER for dental pain and other visits over the last several months.  She is persistently hypertensive.  She states she needs a new primary care physician as she has not been following up.  Suspect poor compliance to her medications and I advised her that she needs to take these as this can cause serious complications in the future such as endorgan damage or stroke.  Here she is neurologically intact and at her baseline no focal deficit.  EKG was benign.  Troponin was negative.  Laboratory work is unremarkable.  She was given IV doses to bring her blood pressure down as it was in the 200s did not want to lower her too much as she likely has been running high for an extended period of time.  We were able to get her down to the 180s over 110s.  She was given her oral dose of Hyzaar as she has been out of this.  I wrote her prescription for this as well.  Imaging was obtained from triage given her symptoms.  Chest x-ray is clear CT of the head was negative and soft tissue neck does not show any signs of abscess but does show likely reactive lymph node.  There is no signs of abscess or infection around the tooth.  I discussed symptomatic treatment and I will start her on antibiotics.  Discussed definitive treatment with the dentist.  She has an appointment in [facility]  I will also refer her to ours  as well.  She will also be given the primary care physician phone line to establish with a new primary care physician.  She states she does have her metoprolol and I will give her her blood pressure Hyzaar prescription for home.  Patient is stable for discharge and outpatient follow-up.  Discussed signs and symptoms to return to the emergency department.  I will give her a short prescription for Percocet for pain control as I do not want her to take increased doses of ibuprofen and aspirin due to her pain.  Prescription monitoring program reviewed and she has not had any narcotics since December of last year.  Patient understands the plan signs and symptoms to return and is discharged in stable condition.    I have reviewed the nursing notes. I have reviewed the findings, diagnosis, plan and need for follow up with the patient.    New Prescriptions    LOSARTAN-HYDROCHLOROTHIAZIDE (HYZAAR) 50-12.5 MG TABLET    Take 1 tablet by mouth daily for 30 days    OXYCODONE-ACETAMINOPHEN (PERCOCET) 5-325 MG TABLET    Take 1 tablet by mouth every 6 hours as needed for pain    PENICILLIN V (VEETID) 500 MG TABLET    Take 1 tablet (500 mg) by mouth 4 times daily for 10 days       Final diagnoses:   Pain, dental   Hypertension, unspecified type       --  Vincent Meyers MD  [facility] EMERGENCY DEPARTMENT  7/10/2022     Vincent Meyers MD  07/10/22 6640

## 2022-07-10 NOTE — ED TRIAGE NOTES
Triage Assessment     Row Name 07/10/22 7735       Triage Assessment (Adult)    Airway WDL WDL       Respiratory WDL    Respiratory WDL WDL       Skin Circulation/Temperature WDL    Skin Circulation/Temperature WDL WDL       Cardiac WDL    Cardiac WDL WDL       Peripheral/Neurovascular WDL    Peripheral Neurovascular WDL WDL       Cognitive/Neuro/Behavioral WDL    Cognitive/Neuro/Behavioral WDL WDL

## 2022-07-10 NOTE — ED PROVIDER NOTES
ED Triage Provider Note  [facility]  Encounter Date: Jul 10, 2022    History:  Chief Complaint   Patient presents with     Dental Pain     Elina Hood is a 41 year old female with a past medical history significant for alcohol abuse, inflammatory polyarthritis, hypertension, cholecystitis, and chronic pancreatitis who presents to the Emergency Department for evaluation of left lower dental pain.  She reports she has had a known dental issue to the lower left molar since April.  When she went to dentistry to have the tooth extracted they declined to extract the tooth at that time due to her hypertension.  She reports she returned in May and again was unable to have the tooth extracted due to continued hypertension. She has been seen in the ED two times in the past 6 months with elevated blood pressure readings.    She reports since yesterday at 9 AM she has taken amount of ibuprofen as her pain was unrelieved, and is concerned she has taken too much.  She reports a history of hypertension, reports she has missed 1 days worth of her medications, but reports she has been taking her other medications regularly.  She reports she does have a left-sided headache, but she believes this is from the tooth pain.  She currently reports no blurry vision, but does report she had episodes of blurry vision recently.    She does deny nausea, vomiting, abdominal pain or changes in bowel or bladder.    Review of Systems:  Review of Systems   Constitutional: Negative for chills and fever.   HENT: Positive for dental problem. Negative for sore throat.    Respiratory: Negative for cough and shortness of breath.    Cardiovascular: Negative for chest pain.   Gastrointestinal: Negative for abdominal pain.   Genitourinary: Negative for flank pain.   Musculoskeletal: Negative for back pain.   Neurological: Positive for headaches.       Exam:  BP (!) 205/143   Pulse 100   Resp 16   LMP   (LMP Unknown)   SpO2 100%   Physical Exam  Vitals and nursing note reviewed.   HENT:      Right Ear: External ear normal.      Left Ear: External ear normal.      Mouth/Throat:        Comments: Cracked tooth, without signs of gumline abscess.  Eyes:      Conjunctiva/sclera: Conjunctivae normal.   Neck:      Thyroid: No thyroid mass.        Comments: Palpable nodule to left neck.  Cardiovascular:      Rate and Rhythm: Normal rate.      Pulses: Normal pulses.      Heart sounds: Normal heart sounds.   Pulmonary:      Effort: Pulmonary effort is normal.      Breath sounds: Normal breath sounds.   Abdominal:      General: Abdomen is flat.      Palpations: Abdomen is soft.      Tenderness: There is no abdominal tenderness.   Musculoskeletal:         General: Normal range of motion.         Medical Decision Making:  Patient arriving to the ED with problem as above. A medical screening exam was performed. CBC, CMP, Istat creatinine, non-contrast head CT and CT neck soft tissue orders initiated from Triage. The patient is appropriate to be immediately roomed.      LAKIA Cook CNP on 7/10/2022 at 2:29 PM        Tae Orlando APRN CNP  07/10/22 7438

## 2022-07-10 NOTE — ED TRIAGE NOTES
Pt arrives ambulatory to triage w/ c/o dentition pain as well as concern for taking too much ibuprofen and ASA. States pain as left sided in ha, jaw, neck. Unable to endorse how much she took. States she crushed aspirin and put it in her tooth. Chronic issue, states she is unable to wait for next available dentist appt.

## 2022-07-11 LAB
ATRIAL RATE - MUSE: 87 BPM
DIASTOLIC BLOOD PRESSURE - MUSE: NORMAL MMHG
INTERPRETATION ECG - MUSE: NORMAL
P AXIS - MUSE: 73 DEGREES
PR INTERVAL - MUSE: 130 MS
QRS DURATION - MUSE: 86 MS
QT - MUSE: 366 MS
QTC - MUSE: 440 MS
R AXIS - MUSE: -3 DEGREES
SYSTOLIC BLOOD PRESSURE - MUSE: NORMAL MMHG
T AXIS - MUSE: 11 DEGREES
VENTRICULAR RATE- MUSE: 87 BPM

## 2022-11-19 ENCOUNTER — HEALTH MAINTENANCE LETTER (OUTPATIENT)
Age: 42
End: 2022-11-19

## 2023-01-14 ENCOUNTER — APPOINTMENT (OUTPATIENT)
Dept: ULTRASOUND IMAGING | Facility: CLINIC | Age: 43
End: 2023-01-14
Attending: EMERGENCY MEDICINE
Payer: COMMERCIAL

## 2023-01-14 ENCOUNTER — HOSPITAL ENCOUNTER (EMERGENCY)
Facility: CLINIC | Age: 43
Discharge: HOME | End: 2023-01-14
Attending: EMERGENCY MEDICINE | Admitting: EMERGENCY MEDICINE
Payer: COMMERCIAL

## 2023-01-14 ENCOUNTER — APPOINTMENT (OUTPATIENT)
Dept: GENERAL RADIOLOGY | Facility: CLINIC | Age: 43
End: 2023-01-14
Attending: EMERGENCY MEDICINE
Payer: COMMERCIAL

## 2023-01-14 VITALS
TEMPERATURE: 98.2 F | OXYGEN SATURATION: 100 % | HEIGHT: 65 IN | RESPIRATION RATE: 20 BRPM | HEART RATE: 86 BPM | WEIGHT: 200 LBS | SYSTOLIC BLOOD PRESSURE: 194 MMHG | DIASTOLIC BLOOD PRESSURE: 100 MMHG | BODY MASS INDEX: 33.32 KG/M2

## 2023-01-14 DIAGNOSIS — M79.604 LEG PAIN, BILATERAL: ICD-10-CM

## 2023-01-14 DIAGNOSIS — M79.605 LEG PAIN, BILATERAL: ICD-10-CM

## 2023-01-14 PROCEDURE — 73610 X-RAY EXAM OF ANKLE: CPT | Mod: RT

## 2023-01-14 PROCEDURE — 93970 EXTREMITY STUDY: CPT | Mod: 26 | Performed by: RADIOLOGY

## 2023-01-14 PROCEDURE — 73610 X-RAY EXAM OF ANKLE: CPT | Mod: 26 | Performed by: RADIOLOGY

## 2023-01-14 PROCEDURE — 99285 EMERGENCY DEPT VISIT HI MDM: CPT | Mod: 25 | Performed by: EMERGENCY MEDICINE

## 2023-01-14 PROCEDURE — 73630 X-RAY EXAM OF FOOT: CPT | Mod: RT

## 2023-01-14 PROCEDURE — 99283 EMERGENCY DEPT VISIT LOW MDM: CPT | Performed by: EMERGENCY MEDICINE

## 2023-01-14 PROCEDURE — 93970 EXTREMITY STUDY: CPT

## 2023-01-14 PROCEDURE — 73630 X-RAY EXAM OF FOOT: CPT | Mod: 26 | Performed by: RADIOLOGY

## 2023-01-14 RX ORDER — IBUPROFEN 600 MG/1
600 TABLET, FILM COATED ORAL ONCE
Status: DISCONTINUED | OUTPATIENT
Start: 2023-01-14 | End: 2023-01-14 | Stop reason: HOSPADM

## 2023-01-14 ASSESSMENT — ACTIVITIES OF DAILY LIVING (ADL): ADLS_ACUITY_SCORE: 33

## 2023-01-14 NOTE — ED PROVIDER NOTES
ED Provider Note  [facility]      History     Chief Complaint   Patient presents with     Leg Swelling     HPI  Elina Hood is a 42 year old female who presents to the ED today with c/o leg pain and leg swelling.  Just got back from [place] 2 days ago.  Noted R leg swelling 2 days ago and now feels that they are heavy feeling. Walking is painful, throbbing sensation from ankle to thigh. L leg pain, No swelling in L side. Swelling on R leg as well, elevation seems to help with this. No trauma or injury that she is aware of. No chest pain or SOB. Has some heartburn which she noticed for the past week. Does have a hx of GERD. No fevers or chills. No nasal congestion or sore throat. Mild dry cough that started today. No SOB. No chest pain. No abdominal pain. No n/v/d. Some mild low back pain. Has used motrin to help with symptoms.     No prior hx of thromboembolic disease. Has some second degree relatives with thromboembolic disease.     Pain got worse with working today (stands all day at work). No redness that she has noticed. No rash.     Past Medical History  Past Medical History:   Diagnosis Date     Allergic state      GERD (gastroesophageal reflux disease)      Hypertension      Past Surgical History:   Procedure Laterality Date     RELEASE TRIGGER FINGER BILATERAL Bilateral 10/3/2019    Procedure: Bilateral Thumb Trigger Finger Release;  Surgeon: Nelson Medrano MD;  Location:  OR     albuterol (ACCUNEB) 1.25 MG/3ML nebulizer solution  aspirin 325 MG tablet  carvedilol (COREG) 25 MG tablet  cetirizine (ZYRTEC) 10 MG tablet  HYDROcodone-acetaminophen (NORCO) 5-325 MG tablet  ibuprofen (ADVIL/MOTRIN) 200 MG tablet  losartan-hydrochlorothiazide (HYZAAR) 50-12.5 MG tablet  losartan-hydrochlorothiazide (HYZAAR) 50-12.5 MG tablet  metoprolol succinate ER (TOPROL XL) 25 MG 24 hr tablet  omeprazole (PRILOSEC) 20 MG CR capsule  oxyCODONE-acetaminophen (PERCOCET) 5-325 MG  "tablet  pantoprazole (PROTONIX) 40 MG EC tablet  VITAMIN D3 50 MCG (2000 UT) tablet      Allergies   Allergen Reactions     Nuts Other (See Comments)     glands in neck swell, pain in ears (peanut butter)     Peanut Oil Swelling     Peanut Butter Flavor Swelling     Mild, still eats it though     Family History  No family history on file.  Social History   Social History     Tobacco Use     Smoking status: Never     Smokeless tobacco: Never   Substance Use Topics     Alcohol use: Yes     Comment: rarely      Drug use: No         A medically appropriate review of systems was performed with pertinent positives and negatives noted in the HPI, and all other systems negative.    Physical Exam   BP: (!) 166/101  Pulse: 81  Temp: 98.5  F (36.9  C)  Resp: 20  Height: 165.1 cm (5' 5\")  Weight: 90.7 kg (200 lb)  SpO2: 100 %  Physical Exam  Vitals and nursing note reviewed.   Constitutional:       General: She is not in acute distress.     Appearance: She is not diaphoretic.      Comments: Alert, cooperative, NAD. Some discomfort with movement and position changes   HENT:      Head: Atraumatic.      Mouth/Throat:      Mouth: Mucous membranes are moist.      Pharynx: Oropharynx is clear. No oropharyngeal exudate.   Eyes:      General: No scleral icterus.     Pupils: Pupils are equal, round, and reactive to light.   Cardiovascular:      Rate and Rhythm: Normal rate.      Pulses: Normal pulses.      Heart sounds: Normal heart sounds. No murmur heard.  Pulmonary:      Effort: No respiratory distress.      Breath sounds: Normal breath sounds.   Abdominal:      General: Bowel sounds are normal.      Palpations: Abdomen is soft.      Tenderness: There is no abdominal tenderness.      Comments: Obese, soft, nontender   Musculoskeletal:         General: Tenderness present.      Comments: RLE: TTP over medial malleolus and dorsum of foot. Some TTP along medial calf. Normal ROM of hip and knee and ankle.Plantarflexion/dorsiflexion " intact. Pedal pulses intact. Sensation intact.    LLE: TTP along posterior L calf. No visible swelling. Pedal pulses intact, sensation intact. Normal ROM of hip, knee and ankle. Plantarflexion/dorsiflexion intact.    Skin:     General: Skin is warm.      Findings: No rash.   Neurological:      Mental Status: She is alert.           ED Course, Procedures, & Data      Procedures                 Results for orders placed or performed during the hospital encounter of 01/14/23   US Lower Extremity Venous Duplex Bilateral     Status: None    Narrative    EXAMINATION: DOPPLER VENOUS ULTRASOUND OF BILATERAL LOWER EXTREMITIES,  1/14/2023 3:57 PM     COMPARISON: None.    HISTORY:     recent long plane trip, B leg pain and swelling, eval for  DVT    TECHNIQUE:  Gray-scale evaluation with compression, spectral flow and  color Doppler assessment of the deep venous system of both legs from  groin to knee, and then at the ankles.    FINDINGS:  In both lower extremities, the common femoral, femoral, popliteal and  posterior tibial veins demonstrate normal compressibility and blood  flow.      Impression    IMPRESSION:  1.  No evidence of deep venous thrombosis in either lower extremity.    I have personally reviewed the examination and initial interpretation  and I agree with the findings.    AMANDA HAYNES MD         SYSTEM ID:  [id number]   Foot  XR, G/E 3 views, right     Status: None    Narrative    EXAM: XR FOOT RIGHT G/E 3 VIEWS  LOCATION: [facility]  DATE/TIME: 1/14/2023 4:11 PM    INDICATION: pain, eval for fx  COMPARISON: None.      Impression    IMPRESSION: Normal joint spaces and alignment. No fracture.   Ankle XR, G/E 3 views, right     Status: None    Narrative    EXAM: XR ANKLE RIGHT G/E 3 VIEWS  LOCATION: [facility]  DATE/TIME: 1/14/2023 4:11 PM    INDICATION: pain, eval for fx  COMPARISON: None.      Impression    IMPRESSION:  Normal joint spaces and alignment. No fracture. No soft tissue swelling.     Medications - No data to display  Labs Ordered and Resulted from Time of ED Arrival to Time of ED Departure - No data to display  Ankle XR, G/E 3 views, right   Final Result   IMPRESSION: Normal joint spaces and alignment. No fracture. No soft tissue swelling.      Foot  XR, G/E 3 views, right   Final Result   IMPRESSION: Normal joint spaces and alignment. No fracture.      US Lower Extremity Venous Duplex Bilateral   Final Result   IMPRESSION:   1.  No evidence of deep venous thrombosis in either lower extremity.      I have personally reviewed the examination and initial interpretation   and I agree with the findings.      AMANDA HAYNES MD            SYSTEM ID:  [id number]             Medical Decision Making  The patient presented with a problem that is acute and uncomplicated.    The patient's evaluation involved:  ordering and review of 3+ test(s) (see separate area of note for details)    The patient's management involved only simple and very low risk treatment.      Assessment & Plan    Patient presents to the emergency department today for the above complaints.  Given her recent travel and family history, need to consider the possibility of DVT.  Bilateral lower extremity ultrasounds were negative.  We also did a right foot x-ray and right ankle x-ray, both of which were negative for acute bony pathology.    Patient was given Motrin here in the ED for pain.  Suspect symptoms are likely secondary to overuse from recent vacation/trip.  Advised NSAIDs, elevation as able.  She is requesting a work note which we will provide for 2 days.  Advised that she follow-up with her primary clinic if symptoms are not improving.  Patient verbalizes understanding    I have reviewed the nursing notes. I have reviewed the findings, diagnosis, plan and need for follow up with the patient.    Discharge Medication List as of 1/14/2023  5:24 PM           Final diagnoses:   Leg pain, bilateral       Cynthia Orlando MD  [facility] EMERGENCY DEPARTMENT  1/14/2023     Cynthia Orlando MD  01/14/23 1940

## 2023-01-14 NOTE — ED TRIAGE NOTES
Returned from [place] Thursday.  Been having RLE pain and swelling since returning, with positional tingling.  LLE just pain.  Denies SOB, fevers.  Fatigue and an intermittent dry cough.

## 2023-01-14 NOTE — LETTER
January 14, 2023      To Whom It May Concern:      Elina Hood was seen in our Emergency Department today, 01/14/23. Please excuse her from work for the next 2 days.    Sincerely,        Cynthia Orlando MD

## 2023-01-14 NOTE — DISCHARGE INSTRUCTIONS
You have been seen in the emergency department today for leg pain.  Your ultrasounds do not show any sign of blood clot in the veins of the legs.  Your x-rays of the ankle and foot were negative for any broken bones.  You likely have pain from overuse/muscle strains from your recent trip.  We recommend that you use ibuprofen, 600 mg every 6 hours as needed for pain.  If you are not noticing improvement in the next few days, we advise that you follow-up with your primary clinic as they may be able to refer you for physical therapy if symptoms are not improving.

## 2023-01-18 ENCOUNTER — OFFICE VISIT (OUTPATIENT)
Dept: OPHTHALMOLOGY | Facility: CLINIC | Age: 43
End: 2023-01-18
Attending: STUDENT IN AN ORGANIZED HEALTH CARE EDUCATION/TRAINING PROGRAM
Payer: COMMERCIAL

## 2023-01-18 DIAGNOSIS — Z97.0 PROSTHETIC EYE GLOBE: Primary | ICD-10-CM

## 2023-01-18 DIAGNOSIS — H52.202 MYOPIA OF LEFT EYE WITH ASTIGMATISM AND PRESBYOPIA: ICD-10-CM

## 2023-01-18 DIAGNOSIS — H52.4 MYOPIA OF LEFT EYE WITH ASTIGMATISM AND PRESBYOPIA: ICD-10-CM

## 2023-01-18 DIAGNOSIS — H52.12 MYOPIA OF LEFT EYE WITH ASTIGMATISM AND PRESBYOPIA: ICD-10-CM

## 2023-01-18 PROCEDURE — G0463 HOSPITAL OUTPT CLINIC VISIT: HCPCS | Mod: 25

## 2023-01-18 PROCEDURE — 92015 DETERMINE REFRACTIVE STATE: CPT

## 2023-01-18 PROCEDURE — 92004 COMPRE OPH EXAM NEW PT 1/>: CPT | Performed by: STUDENT IN AN ORGANIZED HEALTH CARE EDUCATION/TRAINING PROGRAM

## 2023-01-18 PROCEDURE — 92081 LIMITED VISUAL FIELD XM: CPT | Performed by: STUDENT IN AN ORGANIZED HEALTH CARE EDUCATION/TRAINING PROGRAM

## 2023-01-18 RX ORDER — AMLODIPINE BESYLATE 10 MG/1
TABLET ORAL
COMMUNITY
Start: 2022-10-27

## 2023-01-18 ASSESSMENT — CONF VISUAL FIELD
OD_INFERIOR_TEMPORAL_RESTRICTION: 1
OS_SUPERIOR_NASAL_RESTRICTION: 0
OS_INFERIOR_NASAL_RESTRICTION: 0
OS_SUPERIOR_TEMPORAL_RESTRICTION: 0
OS_NORMAL: 1
OS_INFERIOR_TEMPORAL_RESTRICTION: 0
OD_SUPERIOR_NASAL_RESTRICTION: 1
OD_SUPERIOR_TEMPORAL_RESTRICTION: 1
METHOD: COUNTING FINGERS
OD_INFERIOR_NASAL_RESTRICTION: 1

## 2023-01-18 ASSESSMENT — REFRACTION_MANIFEST
OD_SPHERE: BALANCE
OS_SPHERE: -0.75
OS_ADD: +1.25
OS_CYLINDER: +0.25
OS_AXIS: 180

## 2023-01-18 ASSESSMENT — VISUAL ACUITY
METHOD: SNELLEN - LINEAR
OD_SC: PROS
OS_SC: 20/25

## 2023-01-18 ASSESSMENT — TONOMETRY
OS_IOP_MMHG: 16
OD_IOP_MMHG: PROS
IOP_METHOD: TONOPEN

## 2023-01-18 ASSESSMENT — SLIT LAMP EXAM - LIDS
COMMENTS: WNL
COMMENTS: WNL

## 2023-01-18 ASSESSMENT — EXTERNAL EXAM - RIGHT EYE: OD_EXAM: WNL

## 2023-01-18 ASSESSMENT — EXTERNAL EXAM - LEFT EYE: OS_EXAM: WNL

## 2023-01-18 NOTE — NURSING NOTE
Chief Complaints and History of Present Illnesses   Patient presents with     Annual Eye Exam     Chief Complaint(s) and History of Present Illness(es)     Annual Eye Exam            Associated symptoms: discharge (in Right eye) and swelling (in OD eyelids)          Comments    Pt states their vision has worsened slightly since their last routine eye exam.  No Pain, redness, or dryness. No flashes but has had some dark floaters for years. Not using any eyedrops.  Pt states that they experience swelling and discharge in right eye.    Vincent Overton January 18, 2023 2:00 PM  CINDY Spencer 2:00 PM  January 18, 2023
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
## 2023-01-19 ASSESSMENT — CUP TO DISC RATIO: OS_RATIO: 0.25

## 2023-01-25 ENCOUNTER — OFFICE VISIT (OUTPATIENT)
Dept: OPHTHALMOLOGY | Facility: CLINIC | Age: 43
End: 2023-01-25
Attending: STUDENT IN AN ORGANIZED HEALTH CARE EDUCATION/TRAINING PROGRAM
Payer: COMMERCIAL

## 2023-01-25 DIAGNOSIS — H52.202 MYOPIA OF LEFT EYE WITH ASTIGMATISM AND PRESBYOPIA: ICD-10-CM

## 2023-01-25 DIAGNOSIS — H52.12 MYOPIA OF LEFT EYE WITH ASTIGMATISM AND PRESBYOPIA: ICD-10-CM

## 2023-01-25 DIAGNOSIS — H52.4 MYOPIA OF LEFT EYE WITH ASTIGMATISM AND PRESBYOPIA: ICD-10-CM

## 2023-01-25 DIAGNOSIS — H35.3120 NONEXUDATIVE AGE-RELATED MACULAR DEGENERATION OF LEFT EYE, UNSPECIFIED STAGE: ICD-10-CM

## 2023-01-25 DIAGNOSIS — H43.393 VITREOUS SYNERESIS OF BOTH EYES: Primary | ICD-10-CM

## 2023-01-25 DIAGNOSIS — Z97.0 PROSTHETIC EYE GLOBE: ICD-10-CM

## 2023-01-25 PROCEDURE — 92133 CPTRZD OPH DX IMG PST SGM ON: CPT | Performed by: STUDENT IN AN ORGANIZED HEALTH CARE EDUCATION/TRAINING PROGRAM

## 2023-01-25 PROCEDURE — 92134 CPTRZ OPH DX IMG PST SGM RTA: CPT | Performed by: STUDENT IN AN ORGANIZED HEALTH CARE EDUCATION/TRAINING PROGRAM

## 2023-01-25 PROCEDURE — G0463 HOSPITAL OUTPT CLINIC VISIT: HCPCS

## 2023-01-25 PROCEDURE — 92250 FUNDUS PHOTOGRAPHY W/I&R: CPT | Mod: 59 | Performed by: STUDENT IN AN ORGANIZED HEALTH CARE EDUCATION/TRAINING PROGRAM

## 2023-01-25 PROCEDURE — 99207 FUNDUS PHOTOS OS (LEFT EYE): CPT | Mod: 26 | Performed by: STUDENT IN AN ORGANIZED HEALTH CARE EDUCATION/TRAINING PROGRAM

## 2023-01-25 PROCEDURE — 92012 INTRM OPH EXAM EST PATIENT: CPT | Performed by: STUDENT IN AN ORGANIZED HEALTH CARE EDUCATION/TRAINING PROGRAM

## 2023-01-25 ASSESSMENT — CONF VISUAL FIELD
OS_SUPERIOR_NASAL_RESTRICTION: 0
OS_INFERIOR_TEMPORAL_RESTRICTION: 0
OD_INFERIOR_TEMPORAL_RESTRICTION: 1
METHOD: COUNTING FINGERS
OD_SUPERIOR_NASAL_RESTRICTION: 1
OS_SUPERIOR_TEMPORAL_RESTRICTION: 0
OD_INFERIOR_NASAL_RESTRICTION: 1
OD_SUPERIOR_TEMPORAL_RESTRICTION: 1
OS_INFERIOR_NASAL_RESTRICTION: 0
OS_NORMAL: 1

## 2023-01-25 ASSESSMENT — SLIT LAMP EXAM - LIDS
COMMENTS: WNL
COMMENTS: WNL

## 2023-01-25 ASSESSMENT — TONOMETRY
OS_IOP_MMHG: 21
IOP_METHOD: TONOPEN
IOP_METHOD: ICARE
OD_IOP_MMHG: PROS
OD_IOP_MMHG: PROS
OS_IOP_MMHG: 21

## 2023-01-25 ASSESSMENT — EXTERNAL EXAM - LEFT EYE: OS_EXAM: WNL

## 2023-01-25 ASSESSMENT — VISUAL ACUITY
METHOD: SNELLEN - LINEAR
OS_SC: 20/25
OS_SC+: -2
OD_SC: PROS

## 2023-01-25 ASSESSMENT — EXTERNAL EXAM - RIGHT EYE: OD_EXAM: WNL

## 2023-01-25 NOTE — NURSING NOTE
Chief Complaints and History of Present Illnesses   Patient presents with     Follow Up     Chief Complaint(s) and History of Present Illness(es)     Follow Up            Laterality: left eye    Associated symptoms: Negative for dryness, photophobia and flashes    Treatments tried: no treatments    Pain scale: 0/10          Comments    Follow up with imaging.  The patient reports no changes.  Junie Espinoza, COA, COA 10:19 AM 01/25/2023
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
## 2023-04-09 ENCOUNTER — HEALTH MAINTENANCE LETTER (OUTPATIENT)
Age: 43
End: 2023-04-09

## 2023-05-05 ENCOUNTER — APPOINTMENT (OUTPATIENT)
Dept: OPTOMETRY | Facility: CLINIC | Age: 43
End: 2023-05-05
Payer: COMMERCIAL

## 2023-05-05 PROCEDURE — 92341 FIT SPECTACLES BIFOCAL: CPT | Performed by: OPTOMETRIST

## 2024-01-27 ENCOUNTER — HEALTH MAINTENANCE LETTER (OUTPATIENT)
Age: 44
End: 2024-01-27

## 2024-06-15 ENCOUNTER — HEALTH MAINTENANCE LETTER (OUTPATIENT)
Age: 44
End: 2024-06-15

## 2025-07-06 ENCOUNTER — HEALTH MAINTENANCE LETTER (OUTPATIENT)
Age: 45
End: 2025-07-06

## (undated) DEVICE — PACK HAND CUSTOM ASC

## (undated) DEVICE — DRAPE TIBURON HAND 29427

## (undated) DEVICE — GLOVE PROTEXIS BLUE W/NEU-THERA 6.5  2D73EB65

## (undated) DEVICE — GLOVE PROTEXIS POWDER FREE SMT 6.5  2D72PT65X

## (undated) DEVICE — DRAPE STERI TOWEL LG 1010

## (undated) DEVICE — PREP CHLORAPREP 26ML TINTED ORANGE  260815

## (undated) DEVICE — SOL NACL 0.9% IRRIG 500ML BOTTLE 2F7123

## (undated) DEVICE — DRSG ADAPTIC 3X8" 6113

## (undated) DEVICE — COVER CAMERA IN-LIGHT DISP LT-C02

## (undated) DEVICE — SU ETHILON 4-0 PS-2 18" BLACK 1667H

## (undated) DEVICE — BRUSH SURGICAL SCRUB W/4% CHG SOL 25ML 371073

## (undated) DEVICE — LINEN ORTHO PACK 5446

## (undated) RX ORDER — LIDOCAINE HYDROCHLORIDE AND EPINEPHRINE 10; 10 MG/ML; UG/ML
INJECTION, SOLUTION INFILTRATION; PERINEURAL
Status: DISPENSED
Start: 2019-10-03

## (undated) RX ORDER — LIDOCAINE HYDROCHLORIDE 10 MG/ML
INJECTION, SOLUTION EPIDURAL; INFILTRATION; INTRACAUDAL; PERINEURAL
Status: DISPENSED
Start: 2019-06-14

## (undated) RX ORDER — TRIAMCINOLONE ACETONIDE 40 MG/ML
INJECTION, SUSPENSION INTRA-ARTICULAR; INTRAMUSCULAR
Status: DISPENSED
Start: 2019-06-14